# Patient Record
Sex: MALE | Race: BLACK OR AFRICAN AMERICAN | NOT HISPANIC OR LATINO | URBAN - METROPOLITAN AREA
[De-identification: names, ages, dates, MRNs, and addresses within clinical notes are randomized per-mention and may not be internally consistent; named-entity substitution may affect disease eponyms.]

---

## 2018-03-11 ENCOUNTER — EMERGENCY (EMERGENCY)
Facility: HOSPITAL | Age: 48
LOS: 1 days | Discharge: HOME | End: 2018-03-11
Admitting: EMERGENCY MEDICINE
Payer: MEDICAID

## 2018-03-11 VITALS
OXYGEN SATURATION: 98 % | HEART RATE: 96 BPM | DIASTOLIC BLOOD PRESSURE: 88 MMHG | RESPIRATION RATE: 16 BRPM | TEMPERATURE: 99 F | SYSTOLIC BLOOD PRESSURE: 125 MMHG

## 2018-03-11 DIAGNOSIS — M79.604 PAIN IN RIGHT LEG: ICD-10-CM

## 2018-03-11 DIAGNOSIS — F17.200 NICOTINE DEPENDENCE, UNSPECIFIED, UNCOMPLICATED: ICD-10-CM

## 2018-03-11 PROCEDURE — 73590 X-RAY EXAM OF LOWER LEG: CPT | Mod: 26,RT

## 2018-03-11 PROCEDURE — 99283 EMERGENCY DEPT VISIT LOW MDM: CPT | Mod: 25

## 2018-03-11 RX ORDER — IBUPROFEN 200 MG
600 TABLET ORAL ONCE
Qty: 0 | Refills: 0 | Status: COMPLETED | OUTPATIENT
Start: 2018-03-11 | End: 2018-03-11

## 2018-03-12 DIAGNOSIS — Z96.7 PRESENCE OF OTHER BONE AND TENDON IMPLANTS: Chronic | ICD-10-CM

## 2018-03-12 PROCEDURE — 73590 X-RAY EXAM OF LOWER LEG: CPT | Mod: 26,RT

## 2018-03-12 RX ORDER — BACITRACIN ZINC 500 UNIT/G
1 OINTMENT IN PACKET (EA) TOPICAL ONCE
Qty: 0 | Refills: 0 | Status: COMPLETED | OUTPATIENT
Start: 2018-03-12 | End: 2018-03-12

## 2018-03-12 RX ADMIN — Medication 600 MILLIGRAM(S): at 00:54

## 2018-03-12 RX ADMIN — Medication 1 APPLICATION(S): at 00:53

## 2018-03-12 NOTE — ED PROVIDER NOTE - DIAGNOSTIC INTERPRETATION
Xray (wet reads) interpreted by LYNN MARQUIS   Xray tib/fib - hardwares intact, no soft tissue swelling. no acute fx or dislocation, joint space intact, no effusion noted

## 2018-03-12 NOTE — ED PROVIDER NOTE - MEDICAL DECISION MAKING DETAILS
pt w chronic leg pain x 3 months, NV intact, hardwares in place on xray, no s/s of infection on exam, ambulatory without gait disturbance, given dose of motrin, d/c home to f/u pain management and ortho, pt verbalized understanding
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
## 2018-03-12 NOTE — ED PROVIDER NOTE - PHYSICAL EXAMINATION
Gen - WDWN, +AOB, no acute distress  Skin - warm, dry, intact, xerosis to the abdominal pain with excoriation, no erythema, edema, d/c, vesicles or acute rash   HEENT - AT/NC, PERRL, mild conjunctival injection, o/p clear, uvula midline, airway patent  CV - S1S2, R/R/R  Resp - respiration non-labored, CTAB, symmetric bs b/l, no r/r/w  GI - NABS, soft, ND, NT, no rebound or guarding, no CVAT b/l  MS - w/w/p, RLE with no focal erythema, edema, tenderness, fluctuance, warmth or crepitus, NV intact, FROM, ambulatory, no protusion of instruments noted   Neuro - Alert and awake, slightly slurred speech, ambulatory steady gait, no focal deficits Gen - WDWN, +AOB, no acute distress  Skin - warm, dry, intact, xerosis to the abdominal pain with excoriation, no erythema, edema, d/c, vesicles or acute rash   HEENT - AT/NC, PERRL, mild conjunctival injection, o/p clear, uvula midline, airway patent  CV - S1S2, R/R/R  Resp - respiration non-labored, CTAB, symmetric bs b/l, no r/r/w  GI - NABS, soft, ND, NT, no rebound or guarding, no CVAT b/l  MS - w/w/p, RLE with no focal erythema, edema, tenderness, fluctuance, warmth or crepitus, NV intact, FROM, ambulatory, no protrusion of instruments noted, palpable symmetric distal pulses, compartment soft  Neuro - Alert and awake, slightly slurred speech, ambulatory steady gait, no focal deficits

## 2018-09-30 ENCOUNTER — EMERGENCY (EMERGENCY)
Facility: HOSPITAL | Age: 48
LOS: 1 days | Discharge: HOME | End: 2018-09-30
Attending: EMERGENCY MEDICINE | Admitting: EMERGENCY MEDICINE

## 2018-09-30 VITALS
HEART RATE: 99 BPM | RESPIRATION RATE: 20 BRPM | SYSTOLIC BLOOD PRESSURE: 101 MMHG | DIASTOLIC BLOOD PRESSURE: 69 MMHG | OXYGEN SATURATION: 97 % | TEMPERATURE: 98 F

## 2018-09-30 DIAGNOSIS — K08.89 OTHER SPECIFIED DISORDERS OF TEETH AND SUPPORTING STRUCTURES: ICD-10-CM

## 2018-09-30 DIAGNOSIS — Z96.7 PRESENCE OF OTHER BONE AND TENDON IMPLANTS: Chronic | ICD-10-CM

## 2018-09-30 NOTE — ED ADULT TRIAGE NOTE - AS TEMP SITE
Problem: Patient Care Overview (Adult)  Goal: Plan of Care Review  Outcome: Ongoing (interventions implemented as appropriate)    08/09/17 1248   Coping/Psychosocial Response Interventions   Plan Of Care Reviewed With patient   Patient Care Overview   Progress no change       Goal: Adult Individualization and Mutuality  Outcome: Ongoing (interventions implemented as appropriate)    08/09/17 1248   Individualization   Patient Specific Preferences PT GOES BY [facility]       Goal: Discharge Needs Assessment  Outcome: Ongoing (interventions implemented as appropriate)    Problem: Perioperative Period (Adult)  Goal: Signs and Symptoms of Listed Potential Problems Will be Absent or Manageable (Perioperative Period)  Outcome: Ongoing (interventions implemented as appropriate)    08/09/17 1248   Perioperative Period   Problems Assessed (Perioperative Period) pain;infection   Problems Present (Perioperative Period) none           
Problem: Patient Care Overview (Adult)  Goal: Plan of Care Review  Outcome: Outcome(s) achieved Date Met:  08/09/17 08/09/17 1724   Coping/Psychosocial Response Interventions   Plan Of Care Reviewed With patient;spouse   Patient Care Overview   Progress improving   Outcome Evaluation   Outcome Summary/Follow up Plan READY FOR DISCHARGE       Goal: Adult Individualization and Mutuality  Outcome: Outcome(s) achieved Date Met:  08/09/17  Goal: Discharge Needs Assessment  Outcome: Outcome(s) achieved Date Met:  08/09/17    Problem: Perioperative Period (Adult)  Goal: Signs and Symptoms of Listed Potential Problems Will be Absent or Manageable (Perioperative Period)  Outcome: Outcome(s) achieved Date Met:  08/09/17 08/09/17 1724   Perioperative Period   Problems Assessed (Perioperative Period) pain;hemorrhage;hypoxia/hypoxemia;perioperative injury;infection;physiologic stress response;situational response   Problems Present (Perioperative Period) none           
oral

## 2018-10-01 PROBLEM — S82.90XA UNSPECIFIED FRACTURE OF UNSPECIFIED LOWER LEG, INITIAL ENCOUNTER FOR CLOSED FRACTURE: Chronic | Status: ACTIVE | Noted: 2018-03-12
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
## 2018-10-01 PROBLEM — M75.00 ADHESIVE CAPSULITIS OF UNSPECIFIED SHOULDER: Chronic | Status: ACTIVE | Noted: 2018-03-12

## 2019-07-20 ENCOUNTER — EMERGENCY (EMERGENCY)
Facility: HOSPITAL | Age: 49
LOS: 1 days | Discharge: HOME | End: 2019-07-20
Admitting: EMERGENCY MEDICINE
Payer: MEDICAID

## 2019-07-20 VITALS
DIASTOLIC BLOOD PRESSURE: 73 MMHG | TEMPERATURE: 99 F | SYSTOLIC BLOOD PRESSURE: 103 MMHG | HEART RATE: 98 BPM | WEIGHT: 149.91 LBS | RESPIRATION RATE: 18 BRPM | OXYGEN SATURATION: 95 %

## 2019-07-20 DIAGNOSIS — Z96.7 PRESENCE OF OTHER BONE AND TENDON IMPLANTS: Chronic | ICD-10-CM

## 2019-07-20 PROCEDURE — 99283 EMERGENCY DEPT VISIT LOW MDM: CPT

## 2019-07-20 NOTE — ED ADULT NURSE NOTE - NSIMPLEMENTINTERV_GEN_ALL_ED
Implemented All Universal Safety Interventions:  [place] to call system. Call bell, personal items and telephone within reach. Instruct patient to call for assistance. Room bathroom lighting operational. Non-slip footwear when patient is off stretcher. Physically safe environment: no spills, clutter or unnecessary equipment. Stretcher in lowest position, wheels locked, appropriate side rails in place.

## 2019-07-20 NOTE — ED PROVIDER NOTE - PROGRESS NOTE DETAILS
patient now awake, alert, coherent, a&ox4, clear speech, tolerating PO, steady gait, denies SI/HI, will give detox lists. patient asking to leave

## 2019-07-20 NOTE — ED ADULT TRIAGE NOTE - CHIEF COMPLAINT QUOTE
here for ams was picked up [address]- admits to drinking alcohol and taking 180mg of methadone - pt is awake and alert in triage- presents with no obvious injury or trauma- follows simple commands
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
## 2019-07-24 DIAGNOSIS — R41.82 ALTERED MENTAL STATUS, UNSPECIFIED: ICD-10-CM

## 2019-07-24 DIAGNOSIS — F10.10 ALCOHOL ABUSE, UNCOMPLICATED: ICD-10-CM

## 2020-01-09 ENCOUNTER — EMERGENCY (EMERGENCY)
Facility: HOSPITAL | Age: 50
LOS: 1 days | Discharge: HOME | End: 2020-01-09
Attending: EMERGENCY MEDICINE | Admitting: EMERGENCY MEDICINE
Payer: MEDICAID

## 2020-01-09 VITALS
DIASTOLIC BLOOD PRESSURE: 91 MMHG | SYSTOLIC BLOOD PRESSURE: 134 MMHG | RESPIRATION RATE: 20 BRPM | HEART RATE: 80 BPM | OXYGEN SATURATION: 97 % | HEIGHT: 73 IN | TEMPERATURE: 98 F | WEIGHT: 177.91 LBS

## 2020-01-09 DIAGNOSIS — Z96.7 PRESENCE OF OTHER BONE AND TENDON IMPLANTS: Chronic | ICD-10-CM

## 2020-01-09 PROCEDURE — 99283 EMERGENCY DEPT VISIT LOW MDM: CPT

## 2020-01-09 RX ORDER — TETANUS TOXOID, REDUCED DIPHTHERIA TOXOID AND ACELLULAR PERTUSSIS VACCINE, ADSORBED 5; 2.5; 8; 8; 2.5 [IU]/.5ML; [IU]/.5ML; UG/.5ML; UG/.5ML; UG/.5ML
0.5 SUSPENSION INTRAMUSCULAR ONCE
Refills: 0 | Status: COMPLETED | OUTPATIENT
Start: 2020-01-09 | End: 2020-01-09

## 2020-01-09 RX ORDER — IBUPROFEN 200 MG
1 TABLET ORAL
Qty: 30 | Refills: 0
Start: 2020-01-09

## 2020-01-09 RX ORDER — ACETAMINOPHEN 500 MG
2 TABLET ORAL
Qty: 60 | Refills: 0
Start: 2020-01-09

## 2020-01-09 RX ADMIN — TETANUS TOXOID, REDUCED DIPHTHERIA TOXOID AND ACELLULAR PERTUSSIS VACCINE, ADSORBED 0.5 MILLILITER(S): 5; 2.5; 8; 8; 2.5 SUSPENSION INTRAMUSCULAR at 16:46
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
## 2020-01-09 NOTE — ED PROVIDER NOTE - NSFOLLOWUPINSTRUCTIONS_ED_ALL_ED_FT
You were given a tetanus and pertussis (Whooping cough) combined booster. This is good for 10 years.    Please apply bacitracin to the nasal wound 2-3 times per day until it heals.    Return to the ER for signs of infection.

## 2020-01-09 NOTE — ED PROVIDER NOTE - SKIN, MLM
small superficial abrasion to left side of nose with small amount of localized swelling, no bony tenderness

## 2020-01-09 NOTE — ED ADULT NURSE NOTE - NSIMPLEMENTINTERV_GEN_ALL_ED
Implemented All Universal Safety Interventions:  [place] to call system. Call bell, personal items and telephone within reach. Instruct patient to call for assistance. Room bathroom lighting operational. Non-slip footwear when patient is off stretcher. Physically safe environment: no spills, clutter or unnecessary equipment. Stretcher in lowest position, wheels locked, appropriate side rails in place.

## 2020-01-09 NOTE — ED PROVIDER NOTE - PATIENT PORTAL LINK FT
You can access the FollowMyHealth Patient Portal offered by [facility] by registering at the following website: [email or web address]. By joining [employer]’s FollowMyHealth portal, you will also be able to view your health information using other applications (apps) compatible with our system.

## 2020-01-09 NOTE — ED ADULT TRIAGE NOTE - CHIEF COMPLAINT QUOTE
Patient states that his dog accidentally bit him in the face, small abrasion noted to his nasal bridge; patient states his dog should be up to date on vaccines since it was his daughter's dog; patient unsure of tetanus status; no redness, swelling at site

## 2020-01-09 NOTE — ED PROVIDER NOTE - CLINICAL SUMMARY MEDICAL DECISION MAKING FREE TEXT BOX
50 yo M presents with healing abrasion to nose s/p dog bite 2 days ago.  abrasion to left nose with localized swelling, no streaking, pt afebrile. Pt instructed to apply bacitracin to wound, wound care instructions provided. Will update Tetanus.
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
## 2020-01-13 DIAGNOSIS — Z23 ENCOUNTER FOR IMMUNIZATION: ICD-10-CM
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
## 2020-01-13 DIAGNOSIS — S00.31XA ABRASION OF NOSE, INITIAL ENCOUNTER: ICD-10-CM
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
## 2020-01-13 DIAGNOSIS — S01.21XA LACERATION WITHOUT FOREIGN BODY OF NOSE, INITIAL ENCOUNTER: ICD-10-CM

## 2020-01-13 DIAGNOSIS — Z79.1 LONG TERM (CURRENT) USE OF NON-STEROIDAL ANTI-INFLAMMATORIES (NSAID): ICD-10-CM

## 2021-05-15 ENCOUNTER — EMERGENCY (EMERGENCY)
Facility: HOSPITAL | Age: 51
LOS: 1 days | Discharge: HOME | End: 2021-05-15
Attending: EMERGENCY MEDICINE | Admitting: EMERGENCY MEDICINE
Payer: MEDICAID

## 2021-05-15 VITALS
WEIGHT: 190.04 LBS | DIASTOLIC BLOOD PRESSURE: 66 MMHG | HEIGHT: 73 IN | OXYGEN SATURATION: 97 % | HEART RATE: 94 BPM | RESPIRATION RATE: 16 BRPM | SYSTOLIC BLOOD PRESSURE: 120 MMHG | TEMPERATURE: 98 F

## 2021-05-15 DIAGNOSIS — Z79.1 LONG TERM (CURRENT) USE OF NON-STEROIDAL ANTI-INFLAMMATORIES (NSAID): ICD-10-CM

## 2021-05-15 DIAGNOSIS — S60.512A ABRASION OF LEFT HAND, INITIAL ENCOUNTER: ICD-10-CM

## 2021-05-15 DIAGNOSIS — Z96.7 PRESENCE OF OTHER BONE AND TENDON IMPLANTS: Chronic | ICD-10-CM
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
## 2021-05-15 DIAGNOSIS — J45.909 UNSPECIFIED ASTHMA, UNCOMPLICATED: ICD-10-CM
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
## 2021-05-15 PROBLEM — M87.00 IDIOPATHIC ASEPTIC NECROSIS OF UNSPECIFIED BONE: Chronic | Status: ACTIVE | Noted: 2020-01-09

## 2021-05-15 PROCEDURE — 99283 EMERGENCY DEPT VISIT LOW MDM: CPT

## 2021-05-15 RX ORDER — BACITRACIN ZINC 500 UNIT/G
1 OINTMENT IN PACKET (EA) TOPICAL ONCE
Refills: 0 | Status: COMPLETED | OUTPATIENT
Start: 2021-05-15 | End: 2021-05-15

## 2021-05-15 RX ORDER — CEPHALEXIN 500 MG
500 CAPSULE ORAL ONCE
Refills: 0 | Status: COMPLETED | OUTPATIENT
Start: 2021-05-15 | End: 2021-05-15
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
## 2021-05-15 NOTE — ED ADULT NURSE REASSESSMENT NOTE - NS ED NURSE REASSESS COMMENT FT1
checked pt chair area 10miins apart pt not there ed state pt said to her he was leaving, pt did not get po ab therefore med placed back in [facility]

## 2021-05-15 NOTE — ED PROVIDER NOTE - MUSCULOSKELETAL MINIMAL EXAM
+ sts left hand dorsum with overlying superficial abrasions/lacerations with pink granulation tissue, no erythema, no warmth, no lymphangitis, FROM of left hand fingers. + chronic deformity of left 4th DIP from previous fracture 11 years ago ( per patient)
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
## 2021-08-30 ENCOUNTER — EMERGENCY (EMERGENCY)
Facility: HOSPITAL | Age: 51
LOS: 1 days | Discharge: LEFT BEFORE DISCHARGE | End: 2021-08-30
Attending: EMERGENCY MEDICINE | Admitting: EMERGENCY MEDICINE
Payer: MEDICAID

## 2021-08-30 VITALS
TEMPERATURE: 98 F | SYSTOLIC BLOOD PRESSURE: 131 MMHG | HEART RATE: 83 BPM | DIASTOLIC BLOOD PRESSURE: 84 MMHG | RESPIRATION RATE: 17 BRPM | OXYGEN SATURATION: 100 %

## 2021-08-30 VITALS
SYSTOLIC BLOOD PRESSURE: 153 MMHG | DIASTOLIC BLOOD PRESSURE: 95 MMHG | OXYGEN SATURATION: 100 % | WEIGHT: 160.06 LBS | RESPIRATION RATE: 16 BRPM | HEIGHT: 73 IN | TEMPERATURE: 98 F | HEART RATE: 120 BPM

## 2021-08-30 DIAGNOSIS — R11.2 NAUSEA WITH VOMITING, UNSPECIFIED: ICD-10-CM

## 2021-08-30 DIAGNOSIS — R10.84 GENERALIZED ABDOMINAL PAIN: ICD-10-CM

## 2021-08-30 DIAGNOSIS — R00.0 TACHYCARDIA, UNSPECIFIED: ICD-10-CM

## 2021-08-30 DIAGNOSIS — Z87.898 PERSONAL HISTORY OF OTHER SPECIFIED CONDITIONS: ICD-10-CM

## 2021-08-30 DIAGNOSIS — R25.1 TREMOR, UNSPECIFIED: ICD-10-CM

## 2021-08-30 DIAGNOSIS — R19.7 DIARRHEA, UNSPECIFIED: ICD-10-CM

## 2021-08-30 DIAGNOSIS — Z96.7 PRESENCE OF OTHER BONE AND TENDON IMPLANTS: Chronic | ICD-10-CM

## 2021-08-30 DIAGNOSIS — J45.909 UNSPECIFIED ASTHMA, UNCOMPLICATED: ICD-10-CM

## 2021-08-30 DIAGNOSIS — Z79.891 LONG TERM (CURRENT) USE OF OPIATE ANALGESIC: ICD-10-CM
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
## 2021-08-30 DIAGNOSIS — F10.239 ALCOHOL DEPENDENCE WITH WITHDRAWAL, UNSPECIFIED: ICD-10-CM

## 2021-08-30 DIAGNOSIS — G43.909 MIGRAINE, UNSPECIFIED, NOT INTRACTABLE, WITHOUT STATUS MIGRAINOSUS: ICD-10-CM

## 2021-08-30 DIAGNOSIS — Z20.822 CONTACT WITH AND (SUSPECTED) EXPOSURE TO COVID-19: ICD-10-CM

## 2021-08-30 LAB
ALBUMIN SERPL ELPH-MCNC: 3.2 G/DL — LOW (ref 3.4–5)
ALP SERPL-CCNC: 532 U/L — HIGH (ref 40–120)
ALT FLD-CCNC: 177 U/L — HIGH (ref 12–42)
ANION GAP SERPL CALC-SCNC: 14 MMOL/L — SIGNIFICANT CHANGE UP (ref 9–16)
APPEARANCE UR: ABNORMAL
AST SERPL-CCNC: 434 U/L — HIGH (ref 15–37)
BACTERIA # UR AUTO: ABNORMAL /HPF
BASOPHILS # BLD AUTO: 0.01 K/UL — SIGNIFICANT CHANGE UP (ref 0–0.2)
BASOPHILS NFR BLD AUTO: 0.2 % — SIGNIFICANT CHANGE UP (ref 0–2)
BILIRUB SERPL-MCNC: 2.4 MG/DL — HIGH (ref 0.2–1.2)
BILIRUB UR-MCNC: ABNORMAL
BUN SERPL-MCNC: 15 MG/DL — SIGNIFICANT CHANGE UP (ref 7–23)
CALCIUM SERPL-MCNC: 9.4 MG/DL — SIGNIFICANT CHANGE UP (ref 8.5–10.5)
CHLORIDE SERPL-SCNC: 95 MMOL/L — LOW (ref 96–108)
CO2 SERPL-SCNC: 26 MMOL/L — SIGNIFICANT CHANGE UP (ref 22–31)
COLOR SPEC: ABNORMAL
CREAT SERPL-MCNC: 1.17 MG/DL — SIGNIFICANT CHANGE UP (ref 0.5–1.3)
DIFF PNL FLD: ABNORMAL
EOSINOPHIL # BLD AUTO: 0 K/UL — SIGNIFICANT CHANGE UP (ref 0–0.5)
EOSINOPHIL NFR BLD AUTO: 0 % — SIGNIFICANT CHANGE UP (ref 0–6)
EPI CELLS # UR: ABNORMAL /HPF (ref 0–5)
ETHANOL SERPL-MCNC: <3 MG/DL — SIGNIFICANT CHANGE UP
GLUCOSE SERPL-MCNC: 96 MG/DL — SIGNIFICANT CHANGE UP (ref 70–99)
GLUCOSE UR QL: 100
HCT VFR BLD CALC: 34.4 % — LOW (ref 39–50)
HGB BLD-MCNC: 12 G/DL — LOW (ref 13–17)
IMM GRANULOCYTES NFR BLD AUTO: 0.4 % — SIGNIFICANT CHANGE UP (ref 0–1.5)
KETONES UR-MCNC: NEGATIVE — SIGNIFICANT CHANGE UP
LEUKOCYTE ESTERASE UR-ACNC: NEGATIVE — SIGNIFICANT CHANGE UP
LIDOCAIN IGE QN: 70 U/L — LOW (ref 73–393)
LYMPHOCYTES # BLD AUTO: 1.3 K/UL — SIGNIFICANT CHANGE UP (ref 1–3.3)
LYMPHOCYTES # BLD AUTO: 25.7 % — SIGNIFICANT CHANGE UP (ref 13–44)
MANUAL SMEAR VERIFICATION: SIGNIFICANT CHANGE UP
MCHC RBC-ENTMCNC: 33 PG — SIGNIFICANT CHANGE UP (ref 27–34)
MCHC RBC-ENTMCNC: 34.9 GM/DL — SIGNIFICANT CHANGE UP (ref 32–36)
MCV RBC AUTO: 94.5 FL — SIGNIFICANT CHANGE UP (ref 80–100)
MONOCYTES # BLD AUTO: 0.64 K/UL — SIGNIFICANT CHANGE UP (ref 0–0.9)
MONOCYTES NFR BLD AUTO: 12.7 % — SIGNIFICANT CHANGE UP (ref 2–14)
NEUTROPHILS # BLD AUTO: 3.08 K/UL — SIGNIFICANT CHANGE UP (ref 1.8–7.4)
NEUTROPHILS NFR BLD AUTO: 61 % — SIGNIFICANT CHANGE UP (ref 43–77)
NITRITE UR-MCNC: POSITIVE
NRBC # BLD: 0 /100 WBCS — SIGNIFICANT CHANGE UP (ref 0–0)
PH UR: 6 — SIGNIFICANT CHANGE UP (ref 5–8)
PLAT MORPH BLD: SIGNIFICANT CHANGE UP
PLATELET # BLD AUTO: 142 K/UL — LOW (ref 150–400)
POTASSIUM SERPL-MCNC: 4.6 MMOL/L — SIGNIFICANT CHANGE UP (ref 3.5–5.3)
POTASSIUM SERPL-SCNC: 4.6 MMOL/L — SIGNIFICANT CHANGE UP (ref 3.5–5.3)
PROT SERPL-MCNC: 9 G/DL — HIGH (ref 6.4–8.2)
PROT UR-MCNC: 100 MG/DL
RBC # BLD: 3.64 M/UL — LOW (ref 4.2–5.8)
RBC # FLD: 16.4 % — HIGH (ref 10.3–14.5)
RBC BLD AUTO: SIGNIFICANT CHANGE UP
RBC CASTS # UR COMP ASSIST: < 5 /HPF — SIGNIFICANT CHANGE UP
SARS-COV-2 RNA SPEC QL NAA+PROBE: SIGNIFICANT CHANGE UP
SODIUM SERPL-SCNC: 135 MMOL/L — SIGNIFICANT CHANGE UP (ref 132–145)
SP GR SPEC: >=1.03 — SIGNIFICANT CHANGE UP (ref 1–1.03)
UROBILINOGEN FLD QL: 4 E.U./DL
WBC # BLD: 5.05 K/UL — SIGNIFICANT CHANGE UP (ref 3.8–10.5)
WBC # FLD AUTO: 5.05 K/UL — SIGNIFICANT CHANGE UP (ref 3.8–10.5)
WBC UR QL: < 5 /HPF — SIGNIFICANT CHANGE UP

## 2021-08-30 PROCEDURE — 76705 ECHO EXAM OF ABDOMEN: CPT | Mod: 26

## 2021-08-30 PROCEDURE — 93010 ELECTROCARDIOGRAM REPORT: CPT

## 2021-08-30 PROCEDURE — 99284 EMERGENCY DEPT VISIT MOD MDM: CPT | Mod: 25

## 2021-08-30 PROCEDURE — 74177 CT ABD & PELVIS W/CONTRAST: CPT | Mod: 26

## 2021-08-30 RX ORDER — ONDANSETRON 8 MG/1
4 TABLET, FILM COATED ORAL ONCE
Refills: 0 | Status: COMPLETED | OUTPATIENT
Start: 2021-08-30 | End: 2021-08-30

## 2021-08-30 RX ORDER — METOCLOPRAMIDE HCL 10 MG
10 TABLET ORAL ONCE
Refills: 0 | Status: COMPLETED | OUTPATIENT
Start: 2021-08-30 | End: 2021-08-30

## 2021-08-30 RX ORDER — MORPHINE SULFATE 50 MG/1
4 CAPSULE, EXTENDED RELEASE ORAL ONCE
Refills: 0 | Status: DISCONTINUED | OUTPATIENT
Start: 2021-08-30 | End: 2021-08-30

## 2021-08-30 RX ORDER — PANTOPRAZOLE SODIUM 20 MG/1
80 TABLET, DELAYED RELEASE ORAL ONCE
Refills: 0 | Status: COMPLETED | OUTPATIENT
Start: 2021-08-30 | End: 2021-08-30

## 2021-08-30 RX ORDER — SODIUM CHLORIDE 9 MG/ML
1000 INJECTION INTRAMUSCULAR; INTRAVENOUS; SUBCUTANEOUS ONCE
Refills: 0 | Status: COMPLETED | OUTPATIENT
Start: 2021-08-30 | End: 2021-08-30

## 2021-08-30 RX ORDER — IOHEXOL 300 MG/ML
30 INJECTION, SOLUTION INTRAVENOUS ONCE
Refills: 0 | Status: COMPLETED | OUTPATIENT
Start: 2021-08-30 | End: 2021-08-30

## 2021-08-30 RX ADMIN — Medication 10 MILLIGRAM(S): at 21:39

## 2021-08-30 RX ADMIN — Medication 2 MILLIGRAM(S): at 21:10

## 2021-08-30 RX ADMIN — MORPHINE SULFATE 4 MILLIGRAM(S): 50 CAPSULE, EXTENDED RELEASE ORAL at 18:47

## 2021-08-30 RX ADMIN — ONDANSETRON 4 MILLIGRAM(S): 8 TABLET, FILM COATED ORAL at 18:36

## 2021-08-30 RX ADMIN — Medication 104 MILLIGRAM(S): at 21:09

## 2021-08-30 RX ADMIN — SODIUM CHLORIDE 1000 MILLILITER(S): 9 INJECTION INTRAMUSCULAR; INTRAVENOUS; SUBCUTANEOUS at 18:36

## 2021-08-30 RX ADMIN — PANTOPRAZOLE SODIUM 80 MILLIGRAM(S): 20 TABLET, DELAYED RELEASE ORAL at 18:36

## 2021-08-30 RX ADMIN — IOHEXOL 30 MILLILITER(S): 300 INJECTION, SOLUTION INTRAVENOUS at 18:37

## 2021-08-30 RX ADMIN — Medication 75 MILLIGRAM(S): at 21:10

## 2021-08-30 NOTE — ED ADULT NURSE REASSESSMENT NOTE - NS ED NURSE REASSESS COMMENT FT1
Received pt from pervious RN. Pt aox4, speech clear and coherent. Pt c/o generalized abdominal pain. Denies acute nausea, not seen vomiting. Pt endorses drinking daily, denies drug use. States last drink was this morning.

## 2021-08-30 NOTE — ED PROVIDER NOTE - OBJECTIVE STATEMENT
51 yo M w/ PMHx of asthma and seizure disorder, on methadone for prior heroin use (snorting) presents to the ED c/o generalized abdominal pain with N/V x 2 days. Pt states pain is localized over umbilical region, no radiating. Pt reports sharp, cramping pain, severe in nature, with associated multiple episodes of N/V (at least 10 episodes daily). Pt notes emesis was initially NBNB, but notes red specks of blood in late episodes of vomiting today. Pt also notes mild diarrhea with 2-3 episodes loose brown stool yesterday and today. No fever, chills, chest pain, SOB, cough, dysuria or hematuria, back pain or changes in diet or new foods, no recent travel. Pt notes he took his methadone dose this morning without issue.

## 2021-08-30 NOTE — ED PROVIDER NOTE - CLINICAL SUMMARY MEDICAL DECISION MAKING FREE TEXT BOX
Pt presents to the ED w/ diffuse generalized abdominal pain with multiple episodes of N/V/D. Pt with diffuse abdominal tenderness throughout, uncomfortable appearing and regular tachycardia on exam. DDx: intraabdominal infection, pancreatitis, gastroenteritis, appendicitis; Given blood specked emesis earlier today, suspect possible Berenice-Bennett tear - will give Protonix. Will order CT abd/pelvis, EKG, labs including lipase, COVID swab, UA; IV fluids, morphine and zofran given for symptom relief.

## 2021-08-30 NOTE — ED PROVIDER NOTE - PROGRESS NOTE DETAILS
Throughout stay, pt noted to having worsening tremors.  Pt initially denies drinking but now admits to daily drinking.  Given sxs he was unable to drink since yesterday.  Denies h/o etoh w/d.  BYRON is 12.  Wrote for ativan and librium.  Tolerating PO at this time. Pt is adamant that he cannot stay in the ED any longer.  He reports he feels better and needs to get back to his program.  CIWA has improved to ~8.  I discussed risks of leaving prior to CT results and pt accepts these risks.  He is of clear mind and of clear decision making capacity.  I requested multiple times that he stays but he would like to accept risks and sign out against medical advice.  He understands he can return at any time for re-evaluation/further care.

## 2021-08-30 NOTE — ED ADULT NURSE NOTE - OBJECTIVE STATEMENT
Patient presented to the ED with cc of abd pain N/V/D x 2 days. Patient states he can not keep anything down. Patient actively vomiting in the ED. Patient reports being able to take daily methadone dose (190mg).
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
## 2021-08-30 NOTE — ED PROVIDER NOTE - NSFOLLOWUPINSTRUCTIONS_ED_ALL_ED_FT
-PLEASE RETURN HERE OR SEE YOUR PRIMARY CARE DOCTOR AS SOON AS POSSIBLE FOR RE-EVALUATION.        -PLEASE RETURN TO THE ER IMMEDIATELY OR CALL 911 FOR ANY HIGH FEVER, TROUBLE BREATHING, VOMITING, SEVERE PAIN, OR ANY OTHER CONCERNS.

## 2021-08-30 NOTE — ED ADULT TRIAGE NOTE - MEANS OF ARRIVAL
IV removed, VS obtained. Patient received and reviewed discharge instructions and follow up plan. Nad at discharge by ambulation with family at side. Patient ID band not removed prior to discharge. ambulatory

## 2021-08-30 NOTE — ED PROVIDER NOTE - PHYSICAL EXAMINATION
CONSTITUTIONAL: Uncomfortable appearing, Well-developed; well-nourished; in no acute distress.  	SKIN: Skin is warm and dry, no acute rash.  	HEAD: Normocephalic; atraumatic.  	EYES: PERRL, EOM intact; conjunctiva and sclera clear.  	ENT: No nasal discharge; airway clear.  no tonsillar swelling or exudates, uvula midline, airway patent  	NECK: Supple; non tender.  	CARD: S1, S2 normal; no murmurs, gallops, or rubs. Regular tachycardia and rhythm.  	RESP: No wheezes, rales or rhonchi.  	ABD: hyperactive bowel sounds, abdomen soft, nondistended, +tender throughout entire abdomen, no rebound or guarding, no CVA tenderness   	EXT: Normal ROM. No clubbing, cyanosis or edema.  	NEURO: Alert, oriented. Grossly unremarkable.  PSYCH: Cooperative, appropriate.

## 2021-08-30 NOTE — ED ADULT TRIAGE NOTE - WEIGHT IN KG
----- Message from Inna Harvey sent at 11/27/2017  1:35 PM CST -----  Contact: ceci carson  Would like to consult with nurse regarding resending pre op clearance to [phone]. Please call back if needed at [phone]        Thanks,  Inna Harvey     72.6
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
## 2021-08-30 NOTE — ED PROVIDER NOTE - PATIENT PORTAL LINK FT
You can access the FollowMyHealth Patient Portal offered by [facility] by registering at the following website: [email or web address]. By joining [employer]’s FollowMyHealth portal, you will also be able to view your health information using other applications (apps) compatible with our system.

## 2021-12-16 ENCOUNTER — EMERGENCY (EMERGENCY)
Facility: HOSPITAL | Age: 51
LOS: 1 days | Discharge: LEFT BEFORE DISCHARGE | End: 2021-12-16
Attending: EMERGENCY MEDICINE | Admitting: EMERGENCY MEDICINE
Payer: MEDICAID

## 2021-12-16 VITALS
RESPIRATION RATE: 20 BRPM | OXYGEN SATURATION: 96 % | WEIGHT: 175.05 LBS | HEIGHT: 73 IN | DIASTOLIC BLOOD PRESSURE: 72 MMHG | HEART RATE: 80 BPM | SYSTOLIC BLOOD PRESSURE: 98 MMHG | TEMPERATURE: 98 F

## 2021-12-16 DIAGNOSIS — R41.82 ALTERED MENTAL STATUS, UNSPECIFIED: ICD-10-CM

## 2021-12-16 DIAGNOSIS — E16.2 HYPOGLYCEMIA, UNSPECIFIED: ICD-10-CM

## 2021-12-16 DIAGNOSIS — F10.10 ALCOHOL ABUSE, UNCOMPLICATED: ICD-10-CM

## 2021-12-16 DIAGNOSIS — F17.200 NICOTINE DEPENDENCE, UNSPECIFIED, UNCOMPLICATED: ICD-10-CM

## 2021-12-16 DIAGNOSIS — Z20.822 CONTACT WITH AND (SUSPECTED) EXPOSURE TO COVID-19: ICD-10-CM

## 2021-12-16 DIAGNOSIS — Z96.7 PRESENCE OF OTHER BONE AND TENDON IMPLANTS: Chronic | ICD-10-CM

## 2021-12-16 DIAGNOSIS — J45.909 UNSPECIFIED ASTHMA, UNCOMPLICATED: ICD-10-CM

## 2021-12-16 DIAGNOSIS — F11.20 OPIOID DEPENDENCE, UNCOMPLICATED: ICD-10-CM

## 2021-12-16 DIAGNOSIS — Z86.59 PERSONAL HISTORY OF OTHER MENTAL AND BEHAVIORAL DISORDERS: ICD-10-CM

## 2021-12-16 DIAGNOSIS — F13.10 SEDATIVE, HYPNOTIC OR ANXIOLYTIC ABUSE, UNCOMPLICATED: ICD-10-CM

## 2021-12-16 LAB
GLUCOSE BLDC GLUCOMTR-MCNC: 37 MG/DL — CRITICAL LOW (ref 70–99)
GLUCOSE BLDC GLUCOMTR-MCNC: 54 MG/DL — CRITICAL LOW (ref 70–99)
GLUCOSE BLDC GLUCOMTR-MCNC: 56 MG/DL — LOW (ref 70–99)
GLUCOSE BLDC GLUCOMTR-MCNC: 78 MG/DL — SIGNIFICANT CHANGE UP (ref 70–99)
HCT VFR BLD CALC: 33.6 % — LOW (ref 39–50)
HGB BLD-MCNC: 11.7 G/DL — LOW (ref 13–17)
MCHC RBC-ENTMCNC: 33.7 PG — SIGNIFICANT CHANGE UP (ref 27–34)
MCHC RBC-ENTMCNC: 34.8 GM/DL — SIGNIFICANT CHANGE UP (ref 32–36)
MCV RBC AUTO: 96.8 FL — SIGNIFICANT CHANGE UP (ref 80–100)
PLATELET # BLD AUTO: 189 K/UL — SIGNIFICANT CHANGE UP (ref 150–400)
RBC # BLD: 3.47 M/UL — LOW (ref 4.2–5.8)
RBC # FLD: 12.3 % — SIGNIFICANT CHANGE UP (ref 10.3–14.5)
WBC # BLD: 2.98 K/UL — LOW (ref 3.8–10.5)
WBC # FLD AUTO: 2.98 K/UL — LOW (ref 3.8–10.5)

## 2021-12-16 PROCEDURE — 99284 EMERGENCY DEPT VISIT MOD MDM: CPT

## 2021-12-16 RX ORDER — DEXTROSE 10 % IN WATER 10 %
1000 INTRAVENOUS SOLUTION INTRAVENOUS
Refills: 0 | Status: DISCONTINUED | OUTPATIENT
Start: 2021-12-16 | End: 2021-12-17

## 2021-12-16 RX ORDER — DEXTROSE 50 % IN WATER 50 %
50 SYRINGE (ML) INTRAVENOUS ONCE
Refills: 0 | Status: COMPLETED | OUTPATIENT
Start: 2021-12-16 | End: 2021-12-16

## 2021-12-16 RX ADMIN — Medication 150 MILLILITER(S): at 23:52

## 2021-12-16 RX ADMIN — Medication 50 MILLILITER(S): at 23:38

## 2021-12-16 NOTE — ED PROVIDER NOTE - PATIENT PORTAL LINK FT
You can access the FollowMyHealth Patient Portal offered by [facility] by registering at the following website: [email or web address]. By joining [employer]’s FollowMyHealth portal, you will also be able to view your health information using other applications (apps) compatible with our system.

## 2021-12-16 NOTE — ED PROVIDER NOTE - CARE PROVIDER_API CALL
Latrell Mcmillan)  EndocrinologyMetabDiabetes; Internal Medicine  [address] [place] [address]  Phone: [phone]  Fax: [phone]  Follow Up Time:
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
## 2021-12-16 NOTE — ED PROVIDER NOTE - CARE PLAN
1 Principal Discharge DX:	Hypoglycemia  Secondary Diagnosis:	Altered mental status   Principal Discharge DX:	Hypoglycemia  Secondary Diagnosis:	Altered mental status  Secondary Diagnosis:	Polysubstance abuse

## 2021-12-16 NOTE — ED PROVIDER NOTE - PROGRESS NOTE DETAILS
FS downtrended significantly after D10gtt d/c'd and oral repletion given with persistent hypoglycemia, pt reports no h/o hypoglycemia or pancreatitic issues, +alcoholic but reports only have a few beer yesterday and no prolonged fasting noted. Started on paxil 10mg yesterday, but otherwise no new meds Upon further discussion of dispo plan, pt declined admission to [facility] due to concerns for methadone dependence.  Various options and alternatives discussed, pt has decided to leave the emergency department against medical advice. Risks, benefits, alternatives, and potential consequences of current condition (including the possibility of worsening of disease, pain, permanent disability, and/or death) have been fully discussed and explained to pt and family in detail at bedside.  Pt has had the chance to ask questions, verbalized understanding of the aforementioned and still wishes to sign out against medical advice. The patient is awake, alert, oriented  x 4 and has demonstrated capacity to refuse/direct care. Strict return precautions discussed at bedside. Pt was informed to return to ER at any time should he changes his mind or has worsening sx or concerns. Upon further discussion of dispo plan, pt declined admission to [facility] due to concerns for methadone dependence.  Various options and alternatives discussed by various providers and RN at bedside.  Pt reassured that methadone could be verified inpatient and dispensed appropriately but pt has decided to leave the emergency department against medical advice. Risks, benefits, alternatives, and potential consequences of current condition (including the possibility of worsening of disease, pain, permanent disability, and/or death) have been fully discussed and explained to pt in detail at bedside.  Pt has had the chance to ask questions, verbalized understanding of the aforementioned and still wishes to sign out against medical advice. The patient is awake, alert, oriented  x 4 and has demonstrated capacity to refuse/direct care. Strict return precautions discussed at bedside. Pt was given additional dose of D50% and oral supplementation prior to leaving ER.  Informed to return to ER at any time should he changes his mind or has worsening sx or concerns. FS downtrended significantly after D10gtt d/c'd and oral repletion given with persistent hypoglycemia, pt reports no h/o hypoglycemia or pancreatitic issues or DM, not on any sulfonylurea/meglitinide; +alcoholic but reports only have a few beer yesterday and no prolonged fasting noted. Started on paxil 10mg yesterday, but otherwise no new meds

## 2021-12-16 NOTE — ED PROVIDER NOTE - ATTENDING CONTRIBUTION TO CARE
patient with persistant hypoglycemia despite boluses of dextrose, and d10 drip, as well as po challenges. patient offered admission for full work up with likely endocrinology evaluation, declining admission, awake, coherent, alert and oriented, agree with acp documentation and management. patient appears to have insight and capacity. discharge paper work given, patient to go to his methadone clinic and then return to ED. ambulatory upon dc, well appearing.

## 2021-12-16 NOTE — ED PROVIDER NOTE - CLINICAL SUMMARY MEDICAL DECISION MAKING FREE TEXT BOX
pt here for AMS 2/2 polysubstance use and found to be hypoglycemic to 54, s/p oral repletion and serial FS with uptrending FS and improved mental status, monitored in the ED with improved mental status, AFVSS, currently ambulatory with steady gait, tolerating PO without N/V, clear speech, without additional medical complaints noted.  Repeat exam and neuro/cranial nerve exams wnl.  No evidence of head/neck trauma. Pt feels much better and safe for discharge. Counseling provided. Medically stable for d/c. pt here for AMS 2/2 polysubstance use and found to be hypoglycemic to 54, s/p oral and IV repletion and serial FS with uptrending FS and improved mental status, monitored in the ED with improved mental status, AFVSS, currently ambulatory with steady gait, tolerating PO without N/V, clear speech, without additional medical complaints noted.  Repeat exam and neuro/cranial nerve exams wnl.  No evidence of head/neck trauma. Pt feels much better and safe for discharge. Counseling provided. Medically stable for d/c. pt here for AMS 2/2 polysubstance use and found to be hypoglycemic to 54, s/p oral and IV repletion with improvement in serial FS initially. Noted gradual downtrending FS despite monitoring in the ED with oral complex carbohydrate repletion, pt here for AMS 2/2 polysubstance use and found to be hypoglycemic to 54, s/p oral and IV repletion with improvement in serial FS initially. Noted gradual downtrending FS despite monitoring in the ED with oral complex carbohydrate repletion.  New onset hypoglycemia with unknown source, requiring d10gtt, will admit to [facility] for further w/u and monitoring pt here for AMS 2/2 polysubstance use and found to be hypoglycemic to 54, s/p oral and IV repletion with improvement in serial FS initially. Noted gradual downtrending FS despite monitoring in the ED with oral complex carbohydrate repletion.  New onset hypoglycemia with unknown source, requiring prolonged d10gtt and close monitoring of FS, [facility] and monitoring pt here for AMS 2/2 polysubstance use and found to be hypoglycemic to 54, s/p oral and IV repletion with improvement in serial FS initially. Noted gradual downtrending FS despite monitoring in the ED with oral complex carbohydrate repletion and starting on d10 gtt. Precipitates drop in serum glucose once D10gtt d/c'd despite oral repletion. New onset hypoglycemia with unknown etiology, requiring prolonged d10gtt and close monitoring of FS, prior CT A/P from 08/2021 with no acute intraabdominal / pancreatitic abnormalities noted; likely less medication induced hypoglycemia, r/o insulinoma, will admit to [facility] for further w/u and monitoring

## 2021-12-16 NOTE — ED PROVIDER NOTE - PHYSICAL EXAMINATION
Gen - WDWN M, +AOB, lethargic but arousable by verbal stimuli  Skin - warm, dry, intact  HEENT - AT/NC, PERRL, mild conjunctival injection, pupils 3mm b/l, TM intact with no hemotympanium b/l, no facial contusion or periorbital ecchymosis, o/p clear, uvula midline, airway patent, neck supple with no step off or midline tenderness, FROM   CV - S1S2, R/R/R  Resp - respiration non-labored, CTAB, symmetric bs b/l, no r/r/w  GI - NABS, soft, ND, NT, no rebound or guarding, no CVAT b/l  MS - moving all extremities, no c/c/e   Neuro - Lethargic but arousable by verbal stimuli, garble speech and unsteady gait Gen - WDWN M, +AOB, lethargic but arousable by verbal stimuli  Skin - warm, dry, intact  HEENT - AT/NC, PERRL, no conjunctival injection, pupils 3mm b/l, TM intact with no hemotympanium b/l, no facial contusion or periorbital ecchymosis, o/p clear, uvula midline, airway patent, neck supple with no step off or midline tenderness, FROM   CV - S1S2, R/R/R  Resp - respiration non-labored, CTAB, symmetric bs b/l, no r/r/w  GI - NABS, soft, ND, NT, no rebound or guarding, no CVAT b/l  MS - moving all extremities, no c/c/e   Neuro - Lethargic but arousable by verbal stimuli, garble speech and unsteady gait

## 2021-12-17 VITALS
RESPIRATION RATE: 16 BRPM | SYSTOLIC BLOOD PRESSURE: 128 MMHG | HEART RATE: 68 BPM | OXYGEN SATURATION: 99 % | DIASTOLIC BLOOD PRESSURE: 74 MMHG

## 2021-12-17 PROBLEM — Z87.898 PERSONAL HISTORY OF OTHER SPECIFIED CONDITIONS: Chronic | Status: ACTIVE | Noted: 2021-08-30

## 2021-12-17 LAB
ALBUMIN SERPL ELPH-MCNC: 3.7 G/DL — SIGNIFICANT CHANGE UP (ref 3.4–5)
ALP SERPL-CCNC: 115 U/L — SIGNIFICANT CHANGE UP (ref 40–120)
ALT FLD-CCNC: 43 U/L — HIGH (ref 12–42)
ANION GAP SERPL CALC-SCNC: 11 MMOL/L — SIGNIFICANT CHANGE UP (ref 9–16)
ANION GAP SERPL CALC-SCNC: 13 MMOL/L — SIGNIFICANT CHANGE UP (ref 9–16)
AST SERPL-CCNC: 98 U/L — HIGH (ref 15–37)
BASOPHILS # BLD AUTO: 0 K/UL — SIGNIFICANT CHANGE UP (ref 0–0.2)
BASOPHILS NFR BLD AUTO: 0 % — SIGNIFICANT CHANGE UP (ref 0–2)
BILIRUB SERPL-MCNC: 0.2 MG/DL — SIGNIFICANT CHANGE UP (ref 0.2–1.2)
BUN SERPL-MCNC: 10 MG/DL — SIGNIFICANT CHANGE UP (ref 7–23)
BUN SERPL-MCNC: 8 MG/DL — SIGNIFICANT CHANGE UP (ref 7–23)
CALCIUM SERPL-MCNC: 8.4 MG/DL — LOW (ref 8.5–10.5)
CALCIUM SERPL-MCNC: 9 MG/DL — SIGNIFICANT CHANGE UP (ref 8.5–10.5)
CHLORIDE SERPL-SCNC: 100 MMOL/L — SIGNIFICANT CHANGE UP (ref 96–108)
CHLORIDE SERPL-SCNC: 97 MMOL/L — SIGNIFICANT CHANGE UP (ref 96–108)
CO2 SERPL-SCNC: 26 MMOL/L — SIGNIFICANT CHANGE UP (ref 22–31)
CO2 SERPL-SCNC: 26 MMOL/L — SIGNIFICANT CHANGE UP (ref 22–31)
CREAT SERPL-MCNC: 0.75 MG/DL — SIGNIFICANT CHANGE UP (ref 0.5–1.3)
CREAT SERPL-MCNC: 0.88 MG/DL — SIGNIFICANT CHANGE UP (ref 0.5–1.3)
EOSINOPHIL # BLD AUTO: 0.03 K/UL — SIGNIFICANT CHANGE UP (ref 0–0.5)
EOSINOPHIL NFR BLD AUTO: 1 % — SIGNIFICANT CHANGE UP (ref 0–6)
ETHANOL SERPL-MCNC: 135 MG/DL — HIGH
GLUCOSE BLDC GLUCOMTR-MCNC: 146 MG/DL — HIGH (ref 70–99)
GLUCOSE BLDC GLUCOMTR-MCNC: 155 MG/DL — HIGH (ref 70–99)
GLUCOSE BLDC GLUCOMTR-MCNC: 251 MG/DL — HIGH (ref 70–99)
GLUCOSE BLDC GLUCOMTR-MCNC: 78 MG/DL — SIGNIFICANT CHANGE UP (ref 70–99)
GLUCOSE BLDC GLUCOMTR-MCNC: 86 MG/DL — SIGNIFICANT CHANGE UP (ref 70–99)
GLUCOSE BLDC GLUCOMTR-MCNC: 88 MG/DL — SIGNIFICANT CHANGE UP (ref 70–99)
GLUCOSE SERPL-MCNC: 45 MG/DL — CRITICAL LOW (ref 70–99)
GLUCOSE SERPL-MCNC: 65 MG/DL — LOW (ref 70–99)
LYMPHOCYTES # BLD AUTO: 1.7 K/UL — SIGNIFICANT CHANGE UP (ref 1–3.3)
LYMPHOCYTES # BLD AUTO: 57 % — HIGH (ref 13–44)
MAGNESIUM SERPL-MCNC: 2.5 MG/DL — SIGNIFICANT CHANGE UP (ref 1.6–2.6)
MONOCYTES # BLD AUTO: 0.24 K/UL — SIGNIFICANT CHANGE UP (ref 0–0.9)
MONOCYTES NFR BLD AUTO: 8 % — SIGNIFICANT CHANGE UP (ref 2–14)
NEUTROPHILS # BLD AUTO: 0.98 K/UL — LOW (ref 1.8–7.4)
NEUTROPHILS NFR BLD AUTO: 33 % — LOW (ref 43–77)
NRBC # BLD: SIGNIFICANT CHANGE UP /100 WBCS (ref 0–0)
PCO2 BLDV: 41 MMHG — LOW (ref 42–55)
PCP SPEC-MCNC: SIGNIFICANT CHANGE UP
PH BLDV: 7.41 — SIGNIFICANT CHANGE UP (ref 7.32–7.43)
PO2 BLDV: 77 MMHG — HIGH (ref 25–45)
POTASSIUM SERPL-MCNC: 3.5 MMOL/L — SIGNIFICANT CHANGE UP (ref 3.5–5.3)
POTASSIUM SERPL-MCNC: 3.7 MMOL/L — SIGNIFICANT CHANGE UP (ref 3.5–5.3)
POTASSIUM SERPL-SCNC: 3.5 MMOL/L — SIGNIFICANT CHANGE UP (ref 3.5–5.3)
POTASSIUM SERPL-SCNC: 3.7 MMOL/L — SIGNIFICANT CHANGE UP (ref 3.5–5.3)
PROT SERPL-MCNC: 8.4 G/DL — HIGH (ref 6.4–8.2)
SAO2 % BLDV: 97.2 % — HIGH (ref 67–88)
SARS-COV-2 RNA SPEC QL NAA+PROBE: SIGNIFICANT CHANGE UP
SODIUM SERPL-SCNC: 136 MMOL/L — SIGNIFICANT CHANGE UP (ref 132–145)
SODIUM SERPL-SCNC: 137 MMOL/L — SIGNIFICANT CHANGE UP (ref 132–145)

## 2021-12-17 PROCEDURE — 71045 X-RAY EXAM CHEST 1 VIEW: CPT | Mod: 26

## 2021-12-17 RX ORDER — DEXTROSE 50 % IN WATER 50 %
50 SYRINGE (ML) INTRAVENOUS ONCE
Refills: 0 | Status: COMPLETED | OUTPATIENT
Start: 2021-12-17 | End: 2021-12-17

## 2021-12-17 RX ORDER — DEXTROSE 10 % IN WATER 10 %
1000 INTRAVENOUS SOLUTION INTRAVENOUS
Refills: 0 | Status: DISCONTINUED | OUTPATIENT
Start: 2021-12-17 | End: 2021-12-20

## 2021-12-17 RX ADMIN — Medication 50 MILLILITER(S): at 07:26

## 2021-12-17 RX ADMIN — Medication 50 MILLIGRAM(S): at 06:35

## 2021-12-17 RX ADMIN — Medication 100 MILLILITER(S): at 04:54

## 2021-12-17 NOTE — ED ADULT NURSE REASSESSMENT NOTE - NS ED NURSE REASSESS COMMENT FT1
AS per PAULINO Bearden pt. will be leaving AMA so that he could go to his methadone clinic for medication. Pt. understands the risks of leaving the hospital. Pt. states that right after the clinic he will check himself into [facility].
Pt is aox4, pleasant, calm and compliant with all care. FS remain on the low side even after multiple foods and juices. Iv inserted to right forearm 20G patent and intact free of redness swelling or abnormalities. Medicated as per order. Will reassess bgl accordingly.
Pt report received from previous RN, IVF Dex10% infusing at 150ml/hr. Pt resting alert and orient. Will recheck blood sugar at 0030 and adjust fluid rate to appropriate, as discussed with PA.
Pt. received resting in stretcher with eyes closed easily arousable to voice, AAOx3. Pt. is breathing at ease on room air with visible chest rise. 20g to RAC with D10 infusing via IV pump at 100ml/hr, no redness, swelling, or tenderness noted. Fall precautions in place, stretcher alarm on and hourly rounding implemented. Awaiting sobriety for discharge. Monitoring ongoing.
Pt sleeping comfortably in stretcher and easily rousable to verbal and tactile stimuli. Repeat fs 78mg/dL. Given more ginger ale and pb n j. Safety precautions in place and maintained.

## 2022-01-02 NOTE — ED ADULT NURSE NOTE - DOES PATIENT HAVE ADVANCE DIRECTIVE
This is a 89y/o male with PMH of HTN, Hld, Ashd (stents x 14) ? Atrial fib and RA who presents with chest discomfort.  Patient has been having intermittent chest pain for several day. ROBBIE ordonez was hosp at [facility] from 12/28-12/30 and observed and discharged home chest pain free .  Then on 12/31 patient had >2 hours of chest discomfort severe when it went away he felt much better till this am when he bagan to experience chest pain and presents to er after wife called 911.  Presents in ER with chest discomfort which he received plavix and nitro and pain is much better now.  Code stemi had been activated and he was referred to the cath lab  Patient states he is allergic to plavix but him and his wife does not know reaction he gets.    STEMI   Asa plavix  Pravachol   This is a 89y/o male with PMH of HTN, Hld, Ashd (stents x 14) ? Atrial fib and RA who presents with chest discomfort.  Patient has been having intermittent chest pain for several day. ROBBIE ordonez was hosp at [facility] from 12/28-12/30 and observed and discharged home chest pain free .  Then on 12/31 patient had >2 hours of chest discomfort severe when it went away he felt much better till this am when he bagan to experience chest pain and presents to er after wife called 911.  Presents in ER with chest discomfort which he received plavix and nitro and pain is much better now.  Code stemi had been activated and he was referred to the cath lab  Patient states he is allergic to plavix but him and his wife does not know reaction he gets.  Now s/p athrectomy and stent to RCA  see above for full report     STEMI   Asa plavix  Pravachol    Right radial arm neuro vasc checks  Admit to ICU  Telemetry monitoring  Ekg in am  Cbc bmp in the am  Life style changes  Cardiac rehab discussed with patient       This is a 89y/o male with PMH of HTN, Hld, Ashd (stents x 14), Atrial fib and RA who presents with chest discomfort.  Patient has been having intermittent chest pain for several day. H mery was hosp at [facility] from 12/28-12/30 and observed and discharged home chest pain free .  Then on 12/31 patient had >2 hours of chest discomfort severe when it went away he felt much better till this am when he bagan to experience chest pain and presents to er after wife called 911.  Presents in ER with chest discomfort which he received plavix and nitro and pain is much better now.  Code stemi had been activated and he was referred to the cath lab  Patient states he is allergic to plavix but him and his wife does not know reaction he gets.  Now s/p athrectomy and stent to RCA  see above for full report     STEMI   Asa plavix  Pravachol    Right radial arm neuro vasc checks  Admit to ICU  Telemetry monitoring  Ekg in am  Cbc bmp in the am  Life style changes  Cardiac rehab discussed with patient       This is a 89y/o male with PMH of HTN, Hld, Ashd (stents x 14), Atrial fib and RA who presents with chest discomfort.  Patient has been having intermittent chest pain for several day. ROBBIE ordonez was hosp at [facility] from 12/28-12/30 and observed and discharged home chest pain free .  Then on 12/31 patient had >2 hours of chest discomfort severe when it went away he felt much better till this am when he bagan to experience chest pain and presents to er after wife called 911.  Presents in ER with chest discomfort which he received plavix and nitro and pain is much better now.  Code stemi had been activated and he was referred to the cath lab  Patient states he is allergic to plavix but him and his wife does not know reaction he gets.  Now s/p athrectomy and stent to RCA  see above for full report     STEMI s/p RCA stent  Admit to ICU  Has residual LAD dx and will need intervention for in stent stenosis  [facility] to consult and provide further intervention  Trop is pending  Cxr is pending  Asa plavix zocar  Eliquis may be started tonight if no bleeding from radial site  Right radial arm neuro vasc checks  Ekg in am  Cbc bmp in the am  Life style changes  Cardiac rehab discussed with patient       This is a 87y/o male with PMH of HTN, Hld, Ashd (stents x 14), Atrial fib and RA who presents with chest discomfort.  Patient has been having intermittent chest pain for several day. ROBBIE ordonez was hosp at [facility] from 12/28-12/30 and observed and discharged home chest pain free .  Then on 12/31 patient had >2 hours of chest discomfort severe when it went away he felt much better till this am when he bagan to experience chest pain and presents to er after wife called 911.  Presents in ER with chest discomfort which he received plavix and nitro and pain is much better now.  Code stemi had been activated and he was referred to the cath lab  Patient states he is allergic to plavix but him and his wife does not know reaction he gets.  Now s/p athrectomy and stent to RCA  see above for full report     STEMI s/p RCA stent  Admit to ICU  Has residual LAD dx and will need intervention for in stent stenosis  [facility] to consult and provide further intervention  Trop is pending  Cxr is pending  Asa plavix zocar  Eliquis may be started tonight if no bleeding from radial site  restart ace in am if bp is ok  Right radial arm neuro vasc checks  Ekg in am  Cbc bmp in the am  Life style changes  Cardiac rehab discussed with patient       This is a 87y/o male with PMH of HTN, Hld, Ashd (stents x 14), Atrial fib and RA who presents with chest discomfort.  Patient has been having intermittent chest pain for several day. ROBBIE ordonez was hosp at [facility] from 12/28-12/30 and observed and discharged home chest pain free .  Then on 12/31 patient had >2 hours of chest discomfort severe when it went away he felt much better till this am when he bagan to experience chest pain and presents to er after wife called 911.  Presents in ER with chest discomfort which he received plavix and nitro and pain is much better now.  Code stemi had been activated and he was referred to the cath lab  Patient states he is allergic to plavix but him and his wife does not know reaction he gets.  Now s/p athrectomy and stent to RCA  see above for full report     STEMI s/p RCA stent  Admit to ICU  Has residual LAD dx and will need intervention for in stent stenosis  [facility] to consult and provide further intervention  Trop is pending  Cxr is pending  Asa plavix zocar  Eliquis may be started tonight if no bleeding from radial site  restart ace in am if bp is ok  Right radial arm neuro vasc checks  Ekg in am  Cbc bmp in the am  Life style changes  Cardiac rehab discussed with patient  cardiac rehab info provided/referral and communication to cardiac rehab completed       No

## 2022-06-26 ENCOUNTER — EMERGENCY (EMERGENCY)
Facility: HOSPITAL | Age: 52
LOS: 1 days | Discharge: HOME | End: 2022-06-26
Attending: EMERGENCY MEDICINE | Admitting: EMERGENCY MEDICINE
Payer: MEDICAID

## 2022-06-26 VITALS
HEART RATE: 96 BPM | OXYGEN SATURATION: 96 % | HEIGHT: 73 IN | SYSTOLIC BLOOD PRESSURE: 114 MMHG | WEIGHT: 164.91 LBS | TEMPERATURE: 98 F | RESPIRATION RATE: 16 BRPM | DIASTOLIC BLOOD PRESSURE: 77 MMHG

## 2022-06-26 DIAGNOSIS — Z96.7 PRESENCE OF OTHER BONE AND TENDON IMPLANTS: Chronic | ICD-10-CM

## 2022-06-26 PROCEDURE — 71101 X-RAY EXAM UNILAT RIBS/CHEST: CPT | Mod: 26,RT

## 2022-06-26 PROCEDURE — 99284 EMERGENCY DEPT VISIT MOD MDM: CPT | Mod: 25

## 2022-06-26 PROCEDURE — 71101 X-RAY EXAM UNILAT RIBS/CHEST: CPT | Mod: 26,LT

## 2022-06-26 RX ORDER — IBUPROFEN 200 MG
600 TABLET ORAL ONCE
Refills: 0 | Status: COMPLETED | OUTPATIENT
Start: 2022-06-26 | End: 2022-06-26

## 2022-06-26 RX ADMIN — Medication 600 MILLIGRAM(S): at 17:23

## 2022-06-26 NOTE — ED PROVIDER NOTE - NSFOLLOWUPINSTRUCTIONS_ED_ALL_ED_FT
pain control for rib fracture - ibuprofen or tylenol     stay well hydrated     return to ER for shortness of breath, fever or any other concern

## 2022-06-26 NOTE — ED PROVIDER NOTE - PATIENT PORTAL LINK FT
You can access the FollowMyHealth Patient Portal offered by [facility] by registering at the following website: [email or web address]. By joining [employer]’s FollowMyHealth portal, you will also be able to view your health information using other applications (apps) compatible with our system.

## 2022-06-26 NOTE — ED PROVIDER NOTE - OBJECTIVE STATEMENT
50 yo male c/o right chest wall tendenress s/p fall down steps 3 days ago    no head / neck / back trauma or pain  no headache no sob no abd pain   no motor or sensory changes

## 2022-06-26 NOTE — ED PROVIDER NOTE - MUSCULOSKELETAL, MLM
+ right chest wall tenderness lower lateral ribs. skin intact . no ecchymosis No cervical, thoracic ot lumbar spine tenderness to percussion or palpation. Flexion/extension of spine without pain.

## 2022-06-28 DIAGNOSIS — S22.31XA FRACTURE OF ONE RIB, RIGHT SIDE, INITIAL ENCOUNTER FOR CLOSED FRACTURE: ICD-10-CM
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
## 2022-09-27 ENCOUNTER — EMERGENCY (EMERGENCY)
Facility: HOSPITAL | Age: 52
LOS: 1 days | Discharge: HOME | End: 2022-09-27
Attending: EMERGENCY MEDICINE | Admitting: EMERGENCY MEDICINE

## 2022-09-27 VITALS
SYSTOLIC BLOOD PRESSURE: 112 MMHG | HEIGHT: 74 IN | DIASTOLIC BLOOD PRESSURE: 76 MMHG | WEIGHT: 174.17 LBS | OXYGEN SATURATION: 99 % | HEART RATE: 95 BPM | TEMPERATURE: 98 F | RESPIRATION RATE: 15 BRPM

## 2022-09-27 VITALS
DIASTOLIC BLOOD PRESSURE: 76 MMHG | OXYGEN SATURATION: 100 % | SYSTOLIC BLOOD PRESSURE: 109 MMHG | RESPIRATION RATE: 18 BRPM | HEART RATE: 85 BPM

## 2022-09-27 DIAGNOSIS — Z96.7 PRESENCE OF OTHER BONE AND TENDON IMPLANTS: Chronic | ICD-10-CM

## 2022-09-27 PROCEDURE — 73630 X-RAY EXAM OF FOOT: CPT | Mod: 26,RT

## 2022-09-27 PROCEDURE — 99283 EMERGENCY DEPT VISIT LOW MDM: CPT | Mod: 25

## 2022-09-27 RX ORDER — CEPHALEXIN 500 MG
500 CAPSULE ORAL ONCE
Refills: 0 | Status: COMPLETED | OUTPATIENT
Start: 2022-09-27 | End: 2022-09-27

## 2022-09-27 RX ORDER — CEPHALEXIN 500 MG
1 CAPSULE ORAL
Qty: 40 | Refills: 0
Start: 2022-09-27 | End: 2022-10-06

## 2022-09-27 RX ORDER — IBUPROFEN 200 MG
1 TABLET ORAL
Qty: 20 | Refills: 0
Start: 2022-09-27 | End: 2022-10-01

## 2022-09-27 RX ORDER — IBUPROFEN 200 MG
600 TABLET ORAL ONCE
Refills: 0 | Status: COMPLETED | OUTPATIENT
Start: 2022-09-27 | End: 2022-09-27

## 2022-09-27 RX ADMIN — Medication 600 MILLIGRAM(S): at 18:33

## 2022-09-27 RX ADMIN — Medication 1 TABLET(S): at 18:33

## 2022-09-27 RX ADMIN — Medication 500 MILLIGRAM(S): at 18:33

## 2022-09-27 NOTE — ED PROVIDER NOTE - PHYSICAL EXAMINATION
VITAL SIGNS: I have reviewed nursing notes and confirm.  CONSTITUTIONAL: Well-developed; well-nourished; in no acute distress.  SKIN: On the plantar aspect of the R foot is a 3x3 cm area of swelling and tenderness w/o fluctuation, no violation of skin. Rest of foot exam within normal limits.  HEAD: Normocephalic; atraumatic.  EYES: PERRL, EOM intact; conjunctiva and sclera clear.  ENT: No nasal discharge; airway clear.  NECK: Supple; non tender.  CARD: Regular rate and rhythm.  RESP: No respiratory distress.  ABD: Soft; non-distended; non-tender.  MSK: Normal ROM. No clubbing, cyanosis or edema.  NEURO: Alert, oriented. Grossly unremarkable.  PSYCH: Cooperative, appropriate.

## 2022-09-27 NOTE — ED PROVIDER NOTE - OBJECTIVE STATEMENT
52 yo M presenting for pain and swelling to the plantar aspect of the R foot for 2-3 days. Pt describes a throbbing type of pain progressively getting worse. No hx of trauma or knowledge of any FB. No hx of foot or skin infections. No hx of DM. Believes it's infected. No fevers/chills.

## 2022-09-27 NOTE — ED ADULT NURSE NOTE - OBJECTIVE STATEMENT
Pt presents to ED complaining of painful cyst/abcess to the ball of the right foot noticed yesterday. Pt endorses pus production. Denies stepping on foreign body. Denies fevers body aches chills. Not draining at time of assessment. Ambulatory with cane. Hx of HTN.

## 2022-09-27 NOTE — ED PROVIDER NOTE - PATIENT PORTAL LINK FT
You can access the FollowMyHealth Patient Portal offered by [facility] by registering at the following website: [email or web address]. By joining [employer]’s FollowMyHealth portal, you will also be able to view your health information using other applications (apps) compatible with our system.

## 2022-09-27 NOTE — ED ADULT NURSE NOTE - NSIMPLEMENTINTERV_GEN_ALL_ED
Implemented All Fall Risk Interventions:  [place] to call system. Call bell, personal items and telephone within reach. Instruct patient to call for assistance. Room bathroom lighting operational. Non-slip footwear when patient is off stretcher. Physically safe environment: no spills, clutter or unnecessary equipment. Stretcher in lowest position, wheels locked, appropriate side rails in place. Provide visual cue, wrist band, yellow gown, etc. Monitor gait and stability. Monitor for mental status changes and reorient to person, place, and time. Review medications for side effects contributing to fall risk. Reinforce activity limits and safety measures with patient and family.

## 2022-09-27 NOTE — ED PROVIDER NOTE - NSFOLLOWUPINSTRUCTIONS_ED_ALL_ED_FT
Cellulitis    WHAT YOU NEED TO KNOW:    Cellulitis is a skin infection caused by bacteria. Cellulitis is common and can become severe. Cellulitis usually appears on the lower legs. It can also appear on the arms, face, and other areas. Cellulitis develops when bacteria enter a crack or break in your skin, such as a scratch, bite, or cut.  Cellulitis          DISCHARGE INSTRUCTIONS:    Return to the emergency department if:   •Your wound gets larger and more painful.      •You feel a crackling under your skin when you touch it.      •You have purple dots or bumps on your skin, or you see bleeding under your skin.      •You see red streaks coming from the infected area.      Call your doctor if:   •The red, warm, swollen area gets larger.      •Your fever or pain does not go away or gets worse.      •The area does not get smaller after 3 days of antibiotics.      •You have questions or concerns about your condition or care.      Medicines: You should start to see improvement in 3 days. If cellulitis is not treated, the infection can spread through your body and become life-threatening. You may need any of the following medicines:  •Antibiotics help treat a bacterial infection.      •Acetaminophen decreases pain and fever. It is available without a doctor's order. Ask how much to take and how often to take it. Follow directions. Read the labels of all other medicines you are using to see if they also contain acetaminophen, or ask your doctor or pharmacist. Acetaminophen can cause liver damage if not taken correctly.      •NSAIDs, such as ibuprofen, help decrease swelling, pain, and fever. This medicine is available with or without a doctor's order. NSAIDs can cause stomach bleeding or kidney problems in certain people. If you take blood thinner medicine, always ask your healthcare provider if NSAIDs are safe for you. Always read the medicine label and follow directions.      •Take your medicine as directed. Contact your healthcare provider if you think your medicine is not helping or if you have side effects. Tell your provider if you are allergic to any medicine. Keep a list of the medicines, vitamins, and herbs you take. Include the amounts, and when and why you take them. Bring the list or the pill bottles to follow-up visits. Carry your medicine list with you in case of an emergency.      Self-care:   •Wash the area with soap and water every day. Gently pat dry. Use bandages if directed by your healthcare provider.      •Apply cream or ointment as directed. These help protect the area. Most over-the-counter products, such as petroleum jelly, are good to use. Ask your healthcare provider about specific creams or ointments you should use.      •Place a cool, damp cloth on the area. Use clean cloths and clean water. You can do this as often as you need to. Cool, damp cloths may help decrease pain.      •Elevate the area above the level of your heart as often as you can. This will help decrease swelling and pain. Prop the area on pillows or blankets to keep it elevated comfortably.  Elevate Leg           Prevent cellulitis:   •Do not scratch bug bites or areas of injury. You increase your risk for cellulitis by scratching these areas.      •Do not share personal items, such as towels, clothing, and razors.      •Clean exercise equipment with germ-killing  before and after you use it.      •Treat athlete’s foot. This can help prevent the spread of a bacterial skin infection.      •Wash your hands often. Use soap and water. Wash your hands after you use the bathroom, change a child's diapers, or sneeze. Wash your hands before you prepare or eat food. Use lotion to prevent dry, cracked skin.  Handwashing           Follow up with your doctor within 3 days, or as directed: He or she will check if your cellulitis is getting better. Write down your questions so you remember to ask them during your visits.

## 2022-09-27 NOTE — ED PROVIDER NOTE - CLINICAL SUMMARY MEDICAL DECISION MAKING FREE TEXT BOX
Arrange for 30-day monitor and then follow-up afterward.   Plan for X-ray R foot, antibiotics, and pain control. Plan for X-ray R foot, antibiotics, and pain control.    No FB or acute hien abnormalities on x-ray, stared on abx in the ED, given boot and cane to assist in ambulation. Discussed option to I&D, but as there is no area of fluctuance or discernable drainable collection, opting to c/w abx for 48hrs and return for wound check. Given return precautions to return prior to 48hr geovanna if pain is worsening or if pt develops fever/any other new sx.

## 2022-09-29 DIAGNOSIS — L08.9 LOCAL INFECTION OF THE SKIN AND SUBCUTANEOUS TISSUE, UNSPECIFIED: ICD-10-CM

## 2022-09-29 DIAGNOSIS — M79.671 PAIN IN RIGHT FOOT: ICD-10-CM

## 2022-10-20 NOTE — ED PROVIDER NOTE - RADIOLOGY FINDINGS
· Assessment	  54 y/o female PMH of HTN, HLD, hypothyroidism, constipation, CAD, bedbound, ESRD on HD sent in for leukocytosis admitted for infectious workup      Problem/Plan - 1:  ·  Problem: Bacteremia.  Plan: bacteremia with gram positives as drawn in dialysis   repeat cultures in proccess   pt hemodynamically stable  post vanco in ED  ID following   follow repeat cultures.   repeat vanco post HD as needed      Problem/Plan - 2:  ·  Problem: ESRD  on dialysis.       -on HD MWF  -renal following   -plan for HD today     Problem/Plan - 3:  ·  Problem: Sacral decubitus ulcer, stage IV.    - local Wound care   turn and position in this pt with bedbound status      Problem/Plan - 4:  ·  Problem: Hypothyroidism.  Plan: -c/w synthroid.      Problem/Plan - 5:  ·  Problem: CAD in native artery.  Plan: -c/w simvastatin, plavix, lopressor.      Problem/Plan - 6:  Problem: Essential hypertension.   - Continue Current medications and monitor. reviewed by me yes

## 2023-06-28 ENCOUNTER — EMERGENCY (EMERGENCY)
Facility: HOSPITAL | Age: 53
LOS: 1 days | Discharge: HOME | End: 2023-06-28
Admitting: EMERGENCY MEDICINE
Payer: MEDICAID

## 2023-06-28 VITALS
OXYGEN SATURATION: 96 % | RESPIRATION RATE: 18 BRPM | HEART RATE: 86 BPM | DIASTOLIC BLOOD PRESSURE: 85 MMHG | HEIGHT: 73 IN | SYSTOLIC BLOOD PRESSURE: 124 MMHG | TEMPERATURE: 98 F | WEIGHT: 179.9 LBS

## 2023-06-28 VITALS
SYSTOLIC BLOOD PRESSURE: 131 MMHG | HEART RATE: 77 BPM | RESPIRATION RATE: 16 BRPM | TEMPERATURE: 98 F | DIASTOLIC BLOOD PRESSURE: 74 MMHG | OXYGEN SATURATION: 97 %

## 2023-06-28 DIAGNOSIS — Z96.7 PRESENCE OF OTHER BONE AND TENDON IMPLANTS: Chronic | ICD-10-CM

## 2023-06-28 PROCEDURE — 99283 EMERGENCY DEPT VISIT LOW MDM: CPT

## 2023-06-28 RX ADMIN — Medication 1 TABLET(S): at 18:25

## 2023-06-28 NOTE — ED PROVIDER NOTE - IV ALTEPLASE DOOR HIDDEN
Consult Date: 09/14/2021    This consult has been requested by Dr. Irvin for fungating breast mass.    Ms. Hernández is a 63-year-old female who presented to the Emergency Room on 09/13/2021 because of leg swelling.  The patient has a history of restless leg syndrome.  For last 2 months, she was getting worsening leg swelling.  Swelling had gone up all the way up to her groin.  While in the Emergency Room, she also mentioned that she had a breast mass.  Breast exam by ER physician reveals large fungating left breast mass.  Please see the pictures. Multiple investigations were done in the Emergency Room on 09/13/2021:    -WBC of 6.6, hemoglobin of 4.7 and platelet of 576.  MCV of 68.  -CMP revealed mildly low sodium, protein and albumin. Otherwise normal.  -Normal lactic acid.  -INR of 1.22.  -COVID-19 negative.    On further questioning, patient says that she has breast lump for at least 6 months.  It has been slowly growing and has become ulcerated. The patient denies any history of breast cancer or breast abnormality.    REVIEW OF SYSTEMS:  The patient said that she otherwise feels good.  No headache.  No dizziness.  No chest pain.  No shortness of breath at rest.  Some shortness of breath on exertion.  No abdominal pain, nausea or vomiting.  Denies any blood in the urine or stool.  No black stool.  She has been having leg swelling.  She also has some worsening restless leg syndrome symptoms. Mild fatigue.    All other review of systems negative.    ALLERGIES:  REVIEWED.    MEDICATIONS:  Reviewed.    PAST MEDICAL HISTORY:    1.  Restless leg syndrome.  2.  Cholecystectomy.    SOCIAL HISTORY:    -She smoked for a few years while in college. Quit more than 30 years ago.  -No alcohol use.    FAMILY HISTORY:  Denies any history of cancer in the family.    PHYSICAL EXAMINATION:    GENERAL:  She is alert, oriented x 3.  Not in any respiratory distress.  VITALS:  Reviewed.  Rest of systems not examined.    LABORATORY  DATA:  Reviewed.    IMPRESSION:    1.  A 63-year-old female with fungating left breast mass.  This is clinically consistent with breast cancer.  2.  Severe microcytic anemia.  This is secondary to oozing of blood from the ulcerated breast mass.  3.  Thrombocytosis, reactive.  4.  Worsening restless leg symptoms secondary to anemia and iron deficiency.    RECOMMENDATIONS:    1.  Discussed with the patient regarding fungating breast mass.  I explained to her that this is clinically consistent with breast cancer.    We need a tissue diagnosis.  Dr. Godwin from Surgery has seen the patient.  The patient will be having a punch biopsy done later today.  Results will be available in the next 3-5 days.  ER/GA and HER-2/elia may take a little longer.    2.  Discussed regarding staging workup.  CT chest, abdomen and pelvis has been ordered.  We will get a bone scan.  As outpatient, we will consider PET scan.  If there is any evidence of metastatic disease, we will also get a brain MRI.    3.  Discussed regarding treatment.  I explained to the patient that she will need systemic treatment.  Treatment decision will be made depending on ER/GA and HER-2/elia status.  Further discussion after the scans and pathology are available.    4.  She has severe anemia.  This is secondary to oozing of blood from breast cancer.  She has received blood transfusion.  Hemoglobin is better at 8.  MCV has also improved.    She should be transfused for hemoglobin below 7.    Iron studies are pending.  I am going to start her on ferrous sulfate.  Iron studies may not be very accurate as she already received a transfusion before iron studies were drawn.    5.  As she has microcytic anemia, she should also get a GI workup.  I would recommend EGD and colonoscopy.  This can be done as outpatient.    6.  The patient had few questions, which were all answered.  Case discussed with Dr. Jean.    Thanks for the consult.    TOTAL TIME SPENT:  60 minutes.   Time was spent in today's visit, review of chart/investigations today, documentations and communicating with other physicians.    Radha Sandoval MD        D: 2021   T: 2021   MT: ZHOU    Name:     ANNABELLE CONNELL  MRN:      [id number]        Account:      [id number]   :      1958           Consult Date: 2021     Document: [id number]     show

## 2023-06-28 NOTE — ED ADULT NURSE NOTE - NSFALLUNIVINTERV_ED_ALL_ED
Bed/Stretcher in lowest position, wheels locked, appropriate side rails in place/Call bell, personal items and telephone in reach/Instruct patient to call for assistance before getting out of bed/chair/stretcher/Non-slip footwear applied when patient is off stretcher/[place] to call system/Physically safe environment - no spills, clutter or unnecessary equipment/Purposeful proactive rounding/Room/bathroom lighting operational, light cord in reach

## 2023-06-28 NOTE — ED PROVIDER NOTE - OBJECTIVE STATEMENT
51 y/o male c/o right ear fullness and discomfort stating he is having trouble hearing out of that ear. Patient appears well NAD. Denies fever, chills,  abdominal pain, change in bowel function, flank pain, rash, HA, dizziness, SOB, CP, palpitations, diaphoresis, cough, and malaise.

## 2023-06-28 NOTE — ED PROVIDER NOTE - PATIENT PORTAL LINK FT
You can access the FollowMyHealth Patient Portal offered by [facility] by registering at the following website: [email or web address]. By joining [employer]’s FollowMyHealth portal, you will also be able to view your health information using other applications (apps) compatible with our system.

## 2023-07-01 DIAGNOSIS — Z87.09 PERSONAL HISTORY OF OTHER DISEASES OF THE RESPIRATORY SYSTEM: ICD-10-CM

## 2023-07-01 DIAGNOSIS — H66.91 OTITIS MEDIA, UNSPECIFIED, RIGHT EAR: ICD-10-CM

## 2023-07-27 NOTE — ED ADULT NURSE NOTE - TEMPLATE LIST FOR HEAD TO TOE ASSESSMENT
Head, normocephalic, atraumatic, Face, Face within normal limits, Ears, External ears within normal limits, Nose/Nasopharynx, External nose normal appearance, nares patent, no nasal discharge, Mouth and Throat, Oral cavity appearance normal, Lips, Appearance normal
General

## 2023-09-01 ENCOUNTER — EMERGENCY (EMERGENCY)
Facility: HOSPITAL | Age: 53
LOS: 1 days | Discharge: HOME | End: 2023-09-01
Admitting: EMERGENCY MEDICINE
Payer: MEDICAID

## 2023-09-01 VITALS
SYSTOLIC BLOOD PRESSURE: 111 MMHG | HEIGHT: 74 IN | RESPIRATION RATE: 16 BRPM | OXYGEN SATURATION: 95 % | HEART RATE: 105 BPM | DIASTOLIC BLOOD PRESSURE: 80 MMHG | TEMPERATURE: 99 F | WEIGHT: 173.06 LBS

## 2023-09-01 DIAGNOSIS — Z86.59 PERSONAL HISTORY OF OTHER MENTAL AND BEHAVIORAL DISORDERS: ICD-10-CM
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
## 2023-09-01 DIAGNOSIS — Z87.898 PERSONAL HISTORY OF OTHER SPECIFIED CONDITIONS: ICD-10-CM
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
## 2023-09-01 DIAGNOSIS — M54.9 DORSALGIA, UNSPECIFIED: ICD-10-CM

## 2023-09-01 DIAGNOSIS — S20.221A CONTUSION OF RIGHT BACK WALL OF THORAX, INITIAL ENCOUNTER: ICD-10-CM

## 2023-09-01 DIAGNOSIS — Z96.7 PRESENCE OF OTHER BONE AND TENDON IMPLANTS: Chronic | ICD-10-CM

## 2023-09-01 PROCEDURE — 72128 CT CHEST SPINE W/O DYE: CPT | Mod: 26

## 2023-09-01 PROCEDURE — 71250 CT THORAX DX C-: CPT | Mod: 26

## 2023-09-01 PROCEDURE — 99284 EMERGENCY DEPT VISIT MOD MDM: CPT

## 2023-09-01 RX ORDER — LIDOCAINE 4 G/100G
1 CREAM TOPICAL ONCE
Refills: 0 | Status: COMPLETED | OUTPATIENT
Start: 2023-09-01 | End: 2023-09-01

## 2023-09-01 RX ADMIN — LIDOCAINE 1 PATCH: 4 CREAM TOPICAL at 19:35

## 2023-09-01 NOTE — ED PROVIDER NOTE - PROGRESS NOTE DETAILS
Patient signed out to me by patient PA pending imaging  Patient is sitting comfortably in room, no acute stress  Preliminary reports show no acute fractures, however will await official reads.  Patient made aware. Imaging is unremarkable  Patient stable for discharge  Also history of the patient.  Patient or stands negative plan.  Agree to follow-up primary care doctor in 2 to 3 days.

## 2023-09-01 NOTE — ED ADULT NURSE NOTE - OBJECTIVE STATEMENT
Patient reports intermittent lower back pain x 1 hr s/p slip and fall. Denies any dizziness. Denies head strike. Pt free from and abd pain. No bruising or redness noted to area. Pt gait steady. Denies any difficulty urinating or blood in urine.

## 2023-09-01 NOTE — ED PROVIDER NOTE - PHYSICAL EXAMINATION
VITAL SIGNS: I have reviewed nursing notes and confirm.  CONSTITUTIONAL: Well-developed; well-nourished; in no acute distress.  SKIN: Skin is warm and dry, no acute rash.  HEAD: Normocephalic; atraumatic.  NECK: Supple; non tender.  CARD: S1, S2 normal; no murmurs, gallops, or rubs. Regular rate and rhythm.  RESP: No wheezes, rales or rhonchi.  SPINE: +ttp along T9-t12 w/ hematoma on R paraspinal region [at that level]; +ttp along the 11th and 12th lower posterior ribs.  EXT: Normal ROM. No clubbing, cyanosis or edema.  NEURO: Alert, oriented. Grossly unremarkable. TEJADA, normal tone, no gross motor or sensory changes. Fluent speech.   PSYCH: Cooperative, appropriate. Mood and affect wnl.

## 2023-09-01 NOTE — ED PROVIDER NOTE - OBJECTIVE STATEMENT
52-year-old male, history of opiate use disorder on methadone, alcohol use disorder, presenting to the emergency room complaining of mid back pain after falling against a metal mailbox today.  Patient states he was intoxicated while sitting near some mailboxes.  He attempted to get up to walk away but stumbled a bit, not realizing the mailbox was right behind him.  He reports falling directly into it but denies hitting his head or LOC.  Patient endorses pain in the area worsened with it.  Denies chest pain, shortness of breath, fevers, chills, nausea or vomiting.

## 2023-09-01 NOTE — ED ADULT NURSE NOTE - NSFALLUNIVINTERV_ED_ALL_ED
Bed/Stretcher in lowest position, wheels locked, appropriate side rails in place/Call bell, personal items and telephone in reach/Instruct patient to call for assistance before getting out of bed/chair/stretcher/Non-slip footwear applied when patient is off stretcher/[place] to call system/Physically safe environment - no spills, clutter or unnecessary equipment/Purposeful proactive rounding/Room/bathroom lighting operational, light cord in reach

## 2023-09-01 NOTE — ED PROVIDER NOTE - CLINICAL SUMMARY MEDICAL DECISION MAKING FREE TEXT BOX
52-year-old male, history of opiate use disorder on methadone, alcohol use disorder, presenting to the emergency room complaining of mid back pain after falling against a metal mailbox today. Patient is noted to have tenderness to palpation along the mid back as well as the right posterior lower ribs.  Will plan to obtain imaging of the thoracic, lumbar spine regions as well as the chest to evaluate for potential fractures.  Patient has steady gait at this time, but due to his recent intoxication will give topical lidocaine patch for pain relief.  Will reassess and dispo pending medical work-up.

## 2023-09-01 NOTE — ED PROVIDER NOTE - PATIENT PORTAL LINK FT
You can access the FollowMyHealth Patient Portal offered by [facility] by registering at the following website: [email or web address]. By joining [employer]’s FollowMyHealth portal, you will also be able to view your health information using other applications (apps) compatible with our system.

## 2023-09-01 NOTE — ED PROVIDER NOTE - NS ED ROS FT
+back pain, contusion  Denies fevers, chills, nausea, vomiting, diarrhea, constipation, abdominal pain, urinary symptoms, chest pain, palpitations, shortness of breath, dyspnea on exertion, syncope/near syncope, cough/URI symptoms, headache, weakness, numbness, focal deficits, visual changes, gait or balance changes, dizziness

## 2023-09-30 ENCOUNTER — EMERGENCY (EMERGENCY)
Facility: HOSPITAL | Age: 53
LOS: 1 days | Discharge: HOME | End: 2023-09-30
Admitting: EMERGENCY MEDICINE
Payer: MEDICAID

## 2023-09-30 VITALS
RESPIRATION RATE: 18 BRPM | SYSTOLIC BLOOD PRESSURE: 116 MMHG | HEIGHT: 74 IN | DIASTOLIC BLOOD PRESSURE: 83 MMHG | HEART RATE: 100 BPM | OXYGEN SATURATION: 97 % | TEMPERATURE: 99 F

## 2023-09-30 VITALS
SYSTOLIC BLOOD PRESSURE: 122 MMHG | RESPIRATION RATE: 20 BRPM | OXYGEN SATURATION: 95 % | DIASTOLIC BLOOD PRESSURE: 88 MMHG | TEMPERATURE: 99 F | HEART RATE: 97 BPM

## 2023-09-30 DIAGNOSIS — Z96.7 PRESENCE OF OTHER BONE AND TENDON IMPLANTS: Chronic | ICD-10-CM

## 2023-09-30 PROCEDURE — 99284 EMERGENCY DEPT VISIT MOD MDM: CPT

## 2023-09-30 NOTE — ED ADULT TRIAGE NOTE - CHIEF COMPLAINT QUOTE
Walk in pt with complaints of chest discomfort which began directly before arrival to ed. Patient reports he drank alcohol before coming into ed as well. Patient Is aox4 with steady gait and clear coherent speech.

## 2023-09-30 NOTE — ED PROVIDER NOTE - PATIENT PORTAL LINK FT
You can access the FollowMyHealth Patient Portal offered by [facility] by registering at the following website: [email or web address]. By joining [employer]’s FollowMyHealth portal, you will also be able to view your health information using other applications (apps) compatible with our system.

## 2023-09-30 NOTE — ED PROVIDER NOTE - PHYSICAL EXAMINATION
CONSTITUTIONAL: Well-appearing; well-nourished; in no apparent distress. no signs of trauma  	HEAD: Normocephalic; atraumatic.   	EYES:  conjunctiva and sclera clear  	ENT: normal nose; no rhinorrhea; normal pharynx with no erythema or lesions.   	NECK: Supple; non-tender;   	CARDIOVASCULAR: Normal S1, S2; no murmurs, rubs, or gallops. Regular rate and rhythm.   	RESPIRATORY: Breathing easily; breath sounds clear and equal bilaterally; no wheezes, rhonchi, or rales.  	GI: Soft; non-distended; non-tender  	EXT: TEJADA x 4.   	SKIN: Normal for age and race; warm; dry; good turgor; no apparent lesions or rash.   	NEURO: A & O x 3; face symmetric; grossly unremarkable.   PSYCHOLOGICAL: The patient’s mood and manner are appropriate.

## 2023-09-30 NOTE — ED PROVIDER NOTE - OBJECTIVE STATEMENT
51 yo male with hx alcohol abuse here requesting help with alcohol use, drinks daily. last drink just prior to ED arrival, he doesn't remember how much he drank. he has otherwise no other medical complaints. denies trauma or fall.

## 2023-10-03 DIAGNOSIS — R07.9 CHEST PAIN, UNSPECIFIED: ICD-10-CM

## 2023-10-03 DIAGNOSIS — F10.10 ALCOHOL ABUSE, UNCOMPLICATED: ICD-10-CM

## 2023-10-10 ENCOUNTER — EMERGENCY (EMERGENCY)
Facility: HOSPITAL | Age: 53
LOS: 1 days | Discharge: HOME | End: 2023-10-10
Attending: EMERGENCY MEDICINE | Admitting: EMERGENCY MEDICINE
Payer: MEDICAID

## 2023-10-10 VITALS
HEIGHT: 74 IN | RESPIRATION RATE: 15 BRPM | DIASTOLIC BLOOD PRESSURE: 81 MMHG | OXYGEN SATURATION: 98 % | HEART RATE: 86 BPM | SYSTOLIC BLOOD PRESSURE: 111 MMHG | TEMPERATURE: 98 F

## 2023-10-10 VITALS — WEIGHT: 175.93 LBS | HEIGHT: 74 IN

## 2023-10-10 DIAGNOSIS — Z96.7 PRESENCE OF OTHER BONE AND TENDON IMPLANTS: Chronic | ICD-10-CM

## 2023-10-10 PROCEDURE — 99284 EMERGENCY DEPT VISIT MOD MDM: CPT

## 2023-10-10 NOTE — ED ADULT NURSE NOTE - CHIEF COMPLAINT QUOTE
MIRA from [facility]; was found asleep there by security and had unsteady gait as per EMS; admits to taking rx psych meds with a 40oz drink (+ETOH)

## 2023-10-10 NOTE — ED ADULT NURSE REASSESSMENT NOTE - NS ED NURSE REASSESS COMMENT FT1
Pt is A&OX3. Respirations are even and unlabored on room air. Pt is ambulatory by self. Pt is stable. Discharge instructions provided. Pt discharged safely.

## 2023-10-10 NOTE — ED ADULT TRIAGE NOTE - CHIEF COMPLAINT QUOTE
MIRA from [facility]; was found asleep there by security and had unsteady gait as per EMS; admits to taking rx psych meds with a 40oz drink (+ETOH)

## 2023-10-10 NOTE — ED ADULT NURSE NOTE - NSFALLUNIVINTERV_ED_ALL_ED
Bed/Stretcher in lowest position, wheels locked, appropriate side rails in place/Call bell, personal items and telephone in reach/Instruct patient to call for assistance before getting out of bed/chair/stretcher/Non-slip footwear applied when patient is off stretcher/[place] to call system/Physically safe environment - no spills, clutter or unnecessary equipment/Purposeful proactive rounding/Room/bathroom lighting operational, light cord in reach

## 2023-10-10 NOTE — ED PROVIDER NOTE - PATIENT PORTAL LINK FT
You can access the FollowMyHealth Patient Portal offered by [facility] by registering at the following website: [email or web address]. By joining [employer]’s FollowMyHealth portal, you will also be able to view your health information using other applications (apps) compatible with our system.

## 2023-10-10 NOTE — ED PROVIDER NOTE - CLINICAL SUMMARY MEDICAL DECISION MAKING FREE TEXT BOX
Patient here with apparent isolated EtOH intoxication. He is steady and wants to go.  Will observe until more awake, alert, steady and with a safe plan for d/c.

## 2023-10-10 NOTE — ED PROVIDER NOTE - PHYSICAL EXAMINATION
Const: Moderate EtOH intox, good hygiene  ENT: Airway patent, protecting airway. Nasal mucosa clear.   Eyes: Clear bilaterally, pupils equal, round 3mm  MSK: No signs of acute trauma or injury.   Neuro: Awake, alert, normal tone, TEJADA, answers all questions, slight slurred speech, gait steady.  Skin: No signs of acute trauma  Psych: Moderate EtOH intox, mostly cooperative

## 2023-10-10 NOTE — ED PROVIDER NOTE - NSFOLLOWUPINSTRUCTIONS_ED_ALL_ED_FT
Please get help for alcohol abuse if you drink heavily or use drugs on a regular basis.     [facility] is a good referral line for crisis and substance abuse help.  [facility] has drop in programs all over the [facility].    Return to the ER for Emergencies.     [facility]: [phone]   [facility] Substance Abuse Services: [phone], option #2   Methadone Maintenance & Ambulatory Opiate Detox: [phone]  [facility]: [phone]  [facility]: [phone]  [facility]: [phone]    [facility]: [phone], option #2   [facility]: [phone]    [facility]: [phone]    [facility] Central Intake: [phone]  [facility] Chemical Dependency/Ancillary Withdrawal: [phone]  [facility] Methadone Maintenance: [phone]    [facility]: [phone]  [facility] Addiction Treatment Services: [phone]    [facility]Line: 3-856-9-[facility] Office of Alcoholism and Substance Abuse Services (OASAS): https://www.oasas.[place].gov/providerdirectory/  [facility] for Addiction Services and Psychotherapy Interventions Research (CASPIR)  www.[facility].org     Interested in discussing options to reduce your tobacco use?    [facility] for Tobacco Control:  [phone]  [facility] QUITLINE: 5-525-VS-QUITS ([phone])    Interested in learning more about substance use?      http://rethinkingdrinking.niaaa.nih.gov   https://www.drugabuse.gov/patients-families     Learn more about opioid overdose prevention programs in [place]:  http://www.health.[place].gov/diseases/aids/general/opioid_overdose_prevention/

## 2023-10-10 NOTE — ED ADULT NURSE NOTE - OBJECTIVE STATEMENT
Pt is A&OX3. Pt denies any pain. No chest pain, SOB, abdominal pain, N/V. Pt c/o itching scalp. Abrasions and redness noted. Pt states he took his clonopin and had 1 40z to drink today. Respirations are even and unlabored on room air. Pt is A&OX3. Pt denies any pain. No chest pain, SOB, abdominal pain, N/V. Pt c/o itching scalp. Abrasions and redness noted. Pt states he took his clonopin and had 1 40z to drink today. Respirations are even and unlabored on room air. Pt states he is on methadone

## 2023-10-10 NOTE — ED ADULT NURSE NOTE - NSSEPSISSUSPECTED_ED_A_ED
Faxed H&P-Surgeon orders COVID19 Test.  
Pt havig surg on Friday 5-8 plse send h&p with med clearance /will also need covid-19 testing would like to know who would order test/pcp or surgeon  
No

## 2023-10-10 NOTE — ED PROVIDER NOTE - OBJECTIVE STATEMENT
Patient was BIBE for public EtOH intoxication. Wants to leave. No unsteady gait, drowsy. No pain, no n/v and no trauma or incontinence. No drugs.

## 2023-10-12 DIAGNOSIS — Z87.39 PERSONAL HISTORY OF OTHER DISEASES OF THE MUSCULOSKELETAL SYSTEM AND CONNECTIVE TISSUE: ICD-10-CM

## 2023-10-12 DIAGNOSIS — Z87.81 PERSONAL HISTORY OF (HEALED) TRAUMATIC FRACTURE: ICD-10-CM

## 2023-10-12 DIAGNOSIS — J45.909 UNSPECIFIED ASTHMA, UNCOMPLICATED: ICD-10-CM

## 2023-10-12 DIAGNOSIS — Z98.890 OTHER SPECIFIED POSTPROCEDURAL STATES: ICD-10-CM

## 2023-10-12 DIAGNOSIS — Z86.59 PERSONAL HISTORY OF OTHER MENTAL AND BEHAVIORAL DISORDERS: ICD-10-CM

## 2023-10-12 DIAGNOSIS — R41.82 ALTERED MENTAL STATUS, UNSPECIFIED: ICD-10-CM

## 2023-10-12 DIAGNOSIS — F10.129 ALCOHOL ABUSE WITH INTOXICATION, UNSPECIFIED: ICD-10-CM

## 2023-11-11 ENCOUNTER — EMERGENCY (EMERGENCY)
Facility: HOSPITAL | Age: 53
LOS: 1 days | Discharge: HOME | End: 2023-11-11
Attending: EMERGENCY MEDICINE | Admitting: EMERGENCY MEDICINE
Payer: MEDICAID

## 2023-11-11 VITALS
DIASTOLIC BLOOD PRESSURE: 76 MMHG | RESPIRATION RATE: 15 BRPM | TEMPERATURE: 98 F | HEIGHT: 74 IN | HEART RATE: 70 BPM | SYSTOLIC BLOOD PRESSURE: 103 MMHG | OXYGEN SATURATION: 97 %

## 2023-11-11 DIAGNOSIS — Z96.7 PRESENCE OF OTHER BONE AND TENDON IMPLANTS: Chronic | ICD-10-CM

## 2023-11-11 LAB
GLUCOSE BLDC GLUCOMTR-MCNC: 177 MG/DL — HIGH (ref 70–99)
GLUCOSE BLDC GLUCOMTR-MCNC: 177 MG/DL — HIGH (ref 70–99)
GLUCOSE BLDC GLUCOMTR-MCNC: 75 MG/DL — SIGNIFICANT CHANGE UP (ref 70–99)
GLUCOSE BLDC GLUCOMTR-MCNC: 75 MG/DL — SIGNIFICANT CHANGE UP (ref 70–99)

## 2023-11-11 PROCEDURE — 99284 EMERGENCY DEPT VISIT MOD MDM: CPT

## 2023-11-11 RX ORDER — SODIUM CHLORIDE 9 MG/ML
1000 INJECTION INTRAMUSCULAR; INTRAVENOUS; SUBCUTANEOUS ONCE
Refills: 0 | Status: COMPLETED | OUTPATIENT
Start: 2023-11-11 | End: 2023-11-11

## 2023-11-11 NOTE — ED PROVIDER NOTE - PATIENT PORTAL LINK FT
You can access the FollowMyHealth Patient Portal offered by [facility] by registering at the following website: [email or web address]. By joining [employer]’s FollowMyHealth portal, you will also be able to view your health information using other applications (apps) compatible with our system.

## 2023-11-11 NOTE — ED ADULT TRIAGE NOTE - CHIEF COMPLAINT QUOTE
patient BIBA from street; call came over as weakness and confusion; initial fingerstick on scene "was in the 40s and he got 1 packet of oral glucose"; second finger stick was 56; 18g Iv in left hand and received 200cc of D5 iv fluids; also admits to +ETOH and 210mg methadone taken today; drowsy on triage stretcher

## 2023-11-11 NOTE — ED PROVIDER NOTE - NSFOLLOWUPINSTRUCTIONS_ED_ALL_ED_FT
1. stay well hydrated  2. limit alcohol intake   3. follow up with your primary care doctor next week  4. return to ER if your symptoms worsen or for any other concern    Follow up with your primary care doctor or clinics listed below if you do not have a doctor  [address] [address]  To make an appointment, call [phone]  [facility]  Address: [address] [place] [address]  Appointment Center: [phone] ([phone])

## 2023-11-11 NOTE — ED PROVIDER NOTE - OBJECTIVE STATEMENT
triage note: patient BIBA from street; call came over as weakness and confusion; initial fingerstick on scene "was in the 40s and he got 1 packet of oral glucose"; second finger stick was 56; 18g Iv in left hand and received 200cc of D5 iv fluids; also admits to +ETOH and 210mg methadone taken today; drowsy on triage stretcher  - triage note: patient BIBA from street; call came over as weakness and confusion; initial fingerstick on scene "was in the 40s and he got 1 packet of oral glucose"; second finger stick was 56; 18g Iv in left hand and received 200cc of D5 iv fluids; also admits to +ETOH and 210mg methadone taken today; drowsy on triage stretcher  -  This is a 53-year-old male who presents to the emergency room by ambulance who was found with weakness and confusion on the street fingerstick blood glucose was performed which was in the 40s he was given 1 packet of oral glucose and then the second fingerstick was 56 and then he was started on D5 fluids at around 200 cc an hour.  Patient states that he was out collecting cans a day to return for money but that he had not eaten any food.  He also states that he drank 140 ounce beer.  He also states that he took his normal daily dose of 210 mg of methadone.  While collecting cans patient states that he felt slowly and progressively weak and dizzy.  And that is approximately around the time when the ambulance came to pick him up.  Patient states in the emergency room he feels better.  He denies headache chest pain neck pain jaw pain arm pain or back pain.  He denies shortness of breath fever or cough.  Patient denies abdominal pain or extremity pain.  Patient takes Klonopin for his anxiety he takes Dilantin for seizure disorder states that he has not had a seizure in many years.  And takes daily methadone with no recent increase in his dosage.

## 2023-11-11 NOTE — ED ADULT NURSE NOTE - NSFALLHARMRISKINTERV_ED_ALL_ED

## 2023-11-11 NOTE — ED ADULT NURSE REASSESSMENT NOTE - NS ED NURSE REASSESS COMMENT FT1
patient is A&Ox3, in stretcher, refusing to remove clothing, tele monitor, bed alarm. MD is aware. Plan of care is being followed.

## 2023-11-11 NOTE — ED PROVIDER NOTE - NEUROLOGICAL, MLM
awake but drowsy  speech slow but appropriate  and oriented, no focal deficits, no motor or sensory deficits.

## 2023-11-11 NOTE — ED PROVIDER NOTE - CLINICAL SUMMARY MEDICAL DECISION MAKING FREE TEXT BOX
A medical screening examination was performed and no emergency medical condition was identified.    The patient's symptoms progressively improved throughout the ED stay.  The patient tolerated PO fluids.    At the time of discharge from the Emergency Department, the patient is alert with fluent appropriate speech and ambulatory without difficulty. The patient demonstrates capacity at time of discharge and verbally consents to discharge.     ED evaluation and management discussed with the patient and family (if available) in detail.  Close PMD and/or specialist follow up explained and encouraged.  Strict ED return instructions discussed in detail for any worsening or new symptoms. The patient and family (if present) was given the opportunity to ask questions about their ER evaluation, discharge diagnosis and discharge instructions. The patient verbalized understanding of this information and instructions and importantly the need to return to the ED for  worsening of illness or for any concern.    The patient received a printed version of the discharge instructions. The patient verbally expressed understanding that the Emergency Department diagnosis is a preliminary diagnosis often based on limited information and that the patient must adhere to the follow-up plan as discussed. intox + methadone use  with hypoglycemia from not eating today     A medical screening examination was performed and no emergency medical condition was identified.    The patient's symptoms progressively improved throughout the ED stay.  The patient tolerated PO fluids.    At the time of discharge from the Emergency Department, the patient is alert with fluent appropriate speech and ambulatory without difficulty. The patient demonstrates capacity at time of discharge and verbally consents to discharge.     ED evaluation and management discussed with the patient and family (if available) in detail.  Close PMD and/or specialist follow up explained and encouraged.  Strict ED return instructions discussed in detail for any worsening or new symptoms. The patient and family (if present) was given the opportunity to ask questions about their ER evaluation, discharge diagnosis and discharge instructions. The patient verbalized understanding of this information and instructions and importantly the need to return to the ED for  worsening of illness or for any concern.    The patient received a printed version of the discharge instructions. The patient verbally expressed understanding that the Emergency Department diagnosis is a preliminary diagnosis often based on limited information and that the patient must adhere to the follow-up plan as discussed. intox + methadone use  with hypoglycemia from not eating today     refused blood draw  has capacity ate 3 cups of peaches     A medical screening examination was performed and no emergency medical condition was identified.    The patient's symptoms progressively improved throughout the ED stay.  The patient tolerated PO fluids.    At the time of discharge from the Emergency Department, the patient is alert with fluent appropriate speech and ambulatory without difficulty. The patient demonstrates capacity at time of discharge and verbally consents to discharge.     ED evaluation and management discussed with the patient and family (if available) in detail.  Close PMD and/or specialist follow up explained and encouraged.  Strict ED return instructions discussed in detail for any worsening or new symptoms. The patient and family (if present) was given the opportunity to ask questions about their ER evaluation, discharge diagnosis and discharge instructions. The patient verbalized understanding of this information and instructions and importantly the need to return to the ED for  worsening of illness or for any concern.    The patient received a printed version of the discharge instructions. The patient verbally expressed understanding that the Emergency Department diagnosis is a preliminary diagnosis often based on limited information and that the patient must adhere to the follow-up plan as discussed.

## 2023-11-11 NOTE — ED PROVIDER NOTE - DISCHARGE REVIEW MATERIAL PRESENTED
Anesthesia Evaluation     Patient summary reviewed and Nursing notes reviewed   no history of anesthetic complications:  NPO Solid Status: > 8 hours  NPO Liquid Status: > 2 hours           Airway   Mallampati: II  TM distance: >3 FB  Neck ROM: full  No difficulty expected  Dental      Pulmonary - negative pulmonary ROS and normal exam    breath sounds clear to auscultation  Cardiovascular - normal exam  Exercise tolerance: good (4-7 METS)    Rhythm: regular  Rate: normal    (+) hypertension,       Neuro/Psych- negative ROS  GI/Hepatic/Renal/Endo    (+)   diabetes mellitus, thyroid problem     Musculoskeletal (-) negative ROS    Abdominal    Substance History - negative use     OB/GYN negative ob/gyn ROS         Other - negative ROS       ROS/Med Hx Other: PAT Nursing Notes unavailable.                   Anesthesia Plan    ASA 2     general       Anesthetic plan, risks, benefits, and alternatives have been provided, discussed and informed consent has been obtained with: patient and spouse/significant other.        CODE STATUS:        .

## 2023-11-13 DIAGNOSIS — F11.90 OPIOID USE, UNSPECIFIED, UNCOMPLICATED: ICD-10-CM

## 2023-11-13 DIAGNOSIS — E16.2 HYPOGLYCEMIA, UNSPECIFIED: ICD-10-CM

## 2023-11-13 DIAGNOSIS — G40.909 EPILEPSY, UNSPECIFIED, NOT INTRACTABLE, WITHOUT STATUS EPILEPTICUS: ICD-10-CM

## 2023-11-13 DIAGNOSIS — F10.929 ALCOHOL USE, UNSPECIFIED WITH INTOXICATION, UNSPECIFIED: ICD-10-CM

## 2023-11-13 DIAGNOSIS — F41.9 ANXIETY DISORDER, UNSPECIFIED: ICD-10-CM

## 2023-11-15 NOTE — ED ADULT NURSE NOTE - CAS TRG GEN SKIN CONDITION
and physical.    DOS STAFF ADDENDUM:    Pt seen and examined, chart reviewed (including anesthesia, drug and allergy history). No interval changes to history and physical examination. Anesthetic plan, risks, benefits, alternatives, and personnel involved discussed with patient. Patient verbalized an understanding and agrees to proceed.       Caitlyn Lizarraga MD  November 15, 2023  1:59 PM Warm/Dry

## 2024-03-14 ENCOUNTER — EMERGENCY (EMERGENCY)
Facility: HOSPITAL | Age: 54
LOS: 1 days | Discharge: LEFT BEFORE DISCHARGE | End: 2024-03-14
Attending: EMERGENCY MEDICINE | Admitting: EMERGENCY MEDICINE
Payer: MEDICAID

## 2024-03-14 VITALS
HEART RATE: 88 BPM | OXYGEN SATURATION: 97 % | WEIGHT: 149.91 LBS | TEMPERATURE: 98 F | RESPIRATION RATE: 18 BRPM | DIASTOLIC BLOOD PRESSURE: 75 MMHG | SYSTOLIC BLOOD PRESSURE: 124 MMHG

## 2024-03-14 DIAGNOSIS — Z96.7 PRESENCE OF OTHER BONE AND TENDON IMPLANTS: Chronic | ICD-10-CM

## 2024-03-14 PROCEDURE — 99284 EMERGENCY DEPT VISIT MOD MDM: CPT

## 2024-03-14 RX ORDER — ACETAMINOPHEN 500 MG
650 TABLET ORAL ONCE
Refills: 0 | Status: DISCONTINUED | OUTPATIENT
Start: 2024-03-14 | End: 2024-03-18

## 2024-03-14 NOTE — ED PROVIDER NOTE - PHYSICAL EXAMINATION
Constitutional: awake and alert, in no acute distress  HEENT: head normocephalic.  small abrasion to R temporal scalp with surrounding TTP.  moist mucous membranes  Eyes: extraocular movements intact, normal conjunctiva  Neck: supple, normal ROM  Cardiovascular: regular rate   Pulmonary: no respiratory distress  Gastrointestinal: abdomen flat and nondistended  Skin: warm, dry, normal for ethnicity  Musculoskeletal: no edema, no deformity  Neurological: oriented x4, no focal neurologic deficit.   Psychiatric: calm and cooperative
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
## 2024-03-14 NOTE — ED ADULT NURSE REASSESSMENT NOTE - NS ED NURSE REASSESS COMMENT FT1
patient eloped from ED; when RN went to give medications patient was not in chair; checked again at 1810 and again patient was NOT in chair; made provider MD Natali Frye aware and ANA CRISTINA Rowe aware as well; pt eloped
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
## 2024-03-14 NOTE — ED ADULT TRIAGE NOTE - CCCP TRG CHIEF CMPLNT
"Nutrition Care Plan    Nutrition Diagnosis:   Inadequate intake related to small bowel obstruction (now resolving) as evidenced by patient with limited oral intakes x 2 days PTA due to abdominal pain PTA, and NPO/clear/full liquid diet x 3 days since admission. Intervention:  Modified diet:   Continue full liquid diet. Patient reports tolerating intakes. Denies abdominal pain or nausea. Reports some ""stomch gurgling\"" but denies that she is uncomfortable. Consumed 100% of cream of chicken soup, pudding and juice for breakfast. Encouraged intakes as tolerated. RD anticipates diet to advance as medically appropriate. Commercial beverage:   Due to modified diet, patient agreeable to trial one high protein low calorie supplement BID with lunch and dinner. If consumed, will provide 320 kcal and 32 g protein. Purpose of the nutrition education:  Discussed importance of adequate caloric and protein intake and use of oral nutrition supplement to promote appetite. Monitoring and Evaluation:  Amount of food:   Goal - patient to consume >75% of 3 meals and supplements as tolerated. Area(s) and level of knowledge:   Pt verbalized inadequate to basic knowledge before and after education.         " headache
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
## 2024-03-18 DIAGNOSIS — J45.909 UNSPECIFIED ASTHMA, UNCOMPLICATED: ICD-10-CM

## 2024-03-18 DIAGNOSIS — S00.01XA ABRASION OF SCALP, INITIAL ENCOUNTER: ICD-10-CM
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
## 2024-03-18 DIAGNOSIS — R51.9 HEADACHE, UNSPECIFIED: ICD-10-CM

## 2024-03-18 DIAGNOSIS — Z53.29 PROCEDURE AND TREATMENT NOT CARRIED OUT BECAUSE OF PATIENT'S DECISION FOR OTHER REASONS: ICD-10-CM

## 2024-12-28 ENCOUNTER — EMERGENCY (EMERGENCY)
Facility: HOSPITAL | Age: 54
LOS: 1 days | Discharge: HOME | End: 2024-12-28
Attending: EMERGENCY MEDICINE | Admitting: EMERGENCY MEDICINE
Payer: MEDICAID

## 2024-12-28 VITALS
SYSTOLIC BLOOD PRESSURE: 112 MMHG | OXYGEN SATURATION: 95 % | RESPIRATION RATE: 18 BRPM | HEART RATE: 76 BPM | TEMPERATURE: 98 F | DIASTOLIC BLOOD PRESSURE: 72 MMHG

## 2024-12-28 DIAGNOSIS — F10.229 ALCOHOL DEPENDENCE WITH INTOXICATION, UNSPECIFIED: ICD-10-CM

## 2024-12-28 DIAGNOSIS — E16.2 HYPOGLYCEMIA, UNSPECIFIED: ICD-10-CM

## 2024-12-28 DIAGNOSIS — Z96.7 PRESENCE OF OTHER BONE AND TENDON IMPLANTS: Chronic | ICD-10-CM

## 2024-12-28 LAB
ALBUMIN SERPL ELPH-MCNC: 3.1 G/DL — LOW (ref 3.4–5)
ALP SERPL-CCNC: 99 U/L — SIGNIFICANT CHANGE UP (ref 40–120)
ALT FLD-CCNC: 34 U/L — SIGNIFICANT CHANGE UP (ref 12–42)
ANION GAP SERPL CALC-SCNC: 5 MMOL/L — LOW (ref 9–16)
ANION GAP SERPL CALC-SCNC: 8 MMOL/L — LOW (ref 9–16)
AST SERPL-CCNC: 57 U/L — HIGH (ref 15–37)
BASOPHILS # BLD AUTO: 0.02 K/UL — SIGNIFICANT CHANGE UP (ref 0–0.2)
BASOPHILS NFR BLD AUTO: 0.4 % — SIGNIFICANT CHANGE UP (ref 0–2)
BILIRUB SERPL-MCNC: 0.3 MG/DL — SIGNIFICANT CHANGE UP (ref 0.2–1.2)
BUN SERPL-MCNC: 8 MG/DL — SIGNIFICANT CHANGE UP (ref 7–23)
BUN SERPL-MCNC: 8 MG/DL — SIGNIFICANT CHANGE UP (ref 7–23)
CALCIUM SERPL-MCNC: 7.8 MG/DL — LOW (ref 8.5–10.5)
CALCIUM SERPL-MCNC: 8.4 MG/DL — LOW (ref 8.5–10.5)
CHLORIDE SERPL-SCNC: 101 MMOL/L — SIGNIFICANT CHANGE UP (ref 96–108)
CHLORIDE SERPL-SCNC: 94 MMOL/L — LOW (ref 96–108)
CO2 SERPL-SCNC: 26 MMOL/L — SIGNIFICANT CHANGE UP (ref 22–31)
CO2 SERPL-SCNC: 29 MMOL/L — SIGNIFICANT CHANGE UP (ref 22–31)
CREAT SERPL-MCNC: 0.82 MG/DL — SIGNIFICANT CHANGE UP (ref 0.5–1.3)
CREAT SERPL-MCNC: 0.89 MG/DL — SIGNIFICANT CHANGE UP (ref 0.5–1.3)
EGFR: 102 ML/MIN/1.73M2 — SIGNIFICANT CHANGE UP
EGFR: 105 ML/MIN/1.73M2 — SIGNIFICANT CHANGE UP
EOSINOPHIL # BLD AUTO: 0.11 K/UL — SIGNIFICANT CHANGE UP (ref 0–0.5)
EOSINOPHIL NFR BLD AUTO: 2 % — SIGNIFICANT CHANGE UP (ref 0–6)
GLUCOSE SERPL-MCNC: 187 MG/DL — HIGH (ref 70–99)
GLUCOSE SERPL-MCNC: 33 MG/DL — CRITICAL LOW (ref 70–99)
HCT VFR BLD CALC: 31.7 % — LOW (ref 39–50)
HGB BLD-MCNC: 10.5 G/DL — LOW (ref 13–17)
IMM GRANULOCYTES NFR BLD AUTO: 0.4 % — SIGNIFICANT CHANGE UP (ref 0–0.9)
LYMPHOCYTES # BLD AUTO: 1.75 K/UL — SIGNIFICANT CHANGE UP (ref 1–3.3)
LYMPHOCYTES # BLD AUTO: 32.1 % — SIGNIFICANT CHANGE UP (ref 13–44)
MAGNESIUM SERPL-MCNC: 1.8 MG/DL — SIGNIFICANT CHANGE UP (ref 1.6–2.6)
MCHC RBC-ENTMCNC: 32.4 PG — SIGNIFICANT CHANGE UP (ref 27–34)
MCHC RBC-ENTMCNC: 33.1 G/DL — SIGNIFICANT CHANGE UP (ref 32–36)
MCV RBC AUTO: 97.8 FL — SIGNIFICANT CHANGE UP (ref 80–100)
MONOCYTES # BLD AUTO: 0.46 K/UL — SIGNIFICANT CHANGE UP (ref 0–0.9)
MONOCYTES NFR BLD AUTO: 8.4 % — SIGNIFICANT CHANGE UP (ref 2–14)
NEUTROPHILS # BLD AUTO: 3.1 K/UL — SIGNIFICANT CHANGE UP (ref 1.8–7.4)
NEUTROPHILS NFR BLD AUTO: 56.7 % — SIGNIFICANT CHANGE UP (ref 43–77)
NRBC # BLD: 0 /100 WBCS — SIGNIFICANT CHANGE UP (ref 0–0)
PLATELET # BLD AUTO: 273 K/UL — SIGNIFICANT CHANGE UP (ref 150–400)
POTASSIUM SERPL-MCNC: 3.7 MMOL/L — SIGNIFICANT CHANGE UP (ref 3.5–5.3)
POTASSIUM SERPL-MCNC: 4.2 MMOL/L — SIGNIFICANT CHANGE UP (ref 3.5–5.3)
POTASSIUM SERPL-SCNC: 3.7 MMOL/L — SIGNIFICANT CHANGE UP (ref 3.5–5.3)
POTASSIUM SERPL-SCNC: 4.2 MMOL/L — SIGNIFICANT CHANGE UP (ref 3.5–5.3)
PROT SERPL-MCNC: 7.7 G/DL — SIGNIFICANT CHANGE UP (ref 6.4–8.2)
RBC # BLD: 3.24 M/UL — LOW (ref 4.2–5.8)
RBC # FLD: 12.8 % — SIGNIFICANT CHANGE UP (ref 10.3–14.5)
SODIUM SERPL-SCNC: 128 MMOL/L — LOW (ref 132–145)
SODIUM SERPL-SCNC: 135 MMOL/L — SIGNIFICANT CHANGE UP (ref 132–145)
WBC # BLD: 5.46 K/UL — SIGNIFICANT CHANGE UP (ref 3.8–10.5)
WBC # FLD AUTO: 5.46 K/UL — SIGNIFICANT CHANGE UP (ref 3.8–10.5)

## 2024-12-28 PROCEDURE — 74177 CT ABD & PELVIS W/CONTRAST: CPT | Mod: 26,MC

## 2024-12-28 PROCEDURE — 99284 EMERGENCY DEPT VISIT MOD MDM: CPT

## 2024-12-28 RX ORDER — SODIUM CHLORIDE 9 MG/ML
1000 INJECTION, SOLUTION INTRAMUSCULAR; INTRAVENOUS; SUBCUTANEOUS ONCE
Refills: 0 | Status: COMPLETED | OUTPATIENT
Start: 2024-12-28 | End: 2024-12-28

## 2024-12-28 RX ORDER — CHLORDIAZEPOXIDE HCL 10 MG
50 CAPSULE ORAL ONCE
Refills: 0 | Status: DISCONTINUED | OUTPATIENT
Start: 2024-12-28 | End: 2024-12-28

## 2024-12-28 RX ORDER — 0.9 % SODIUM CHLORIDE 0.9 %
1000 INTRAVENOUS SOLUTION INTRAVENOUS
Refills: 0 | Status: DISCONTINUED | OUTPATIENT
Start: 2024-12-28 | End: 2025-01-01

## 2024-12-28 RX ADMIN — Medication 50 MILLILITER(S): at 22:45

## 2024-12-28 RX ADMIN — Medication 250 MILLILITER(S): at 20:12

## 2024-12-28 RX ADMIN — SODIUM CHLORIDE 1000 MILLILITER(S): 9 INJECTION, SOLUTION INTRAMUSCULAR; INTRAVENOUS; SUBCUTANEOUS at 16:50

## 2024-12-28 RX ADMIN — Medication 50 MILLILITER(S): at 16:41

## 2024-12-28 RX ADMIN — Medication 50 MILLIGRAM(S): at 19:46

## 2024-12-28 RX ADMIN — Medication 50 MILLILITER(S): at 20:03

## 2024-12-28 RX ADMIN — SODIUM CHLORIDE 1000 MILLILITER(S): 9 INJECTION, SOLUTION INTRAMUSCULAR; INTRAVENOUS; SUBCUTANEOUS at 18:53

## 2024-12-28 RX ADMIN — SODIUM CHLORIDE 1000 MILLILITER(S): 9 INJECTION, SOLUTION INTRAMUSCULAR; INTRAVENOUS; SUBCUTANEOUS at 20:01

## 2024-12-28 NOTE — ED PROVIDER NOTE - NEUROLOGICAL, MLM
Alert and oriented, no focal deficits, no motor or sensory deficits. drowsy but cooperative with fluent and appropriate speech  oriented, no focal deficits, no motor or sensory deficits.

## 2024-12-28 NOTE — ED ADULT NURSE NOTE - CHIEF COMPLAINT QUOTE
intoxicated at [facility] took 210mg methadone also, accucheck 88mmols in field , admits to drinking [place] every day, checked accucheck at triage dr castorena aware of hypoglycemia, p3earl drank x2 ginger ales and given dextrose iv

## 2024-12-28 NOTE — ED PROVIDER NOTE - OBJECTIVE STATEMENT
intoxicated at [facility] took 210mg methadone also, answering all questions accucheck 88mmols, admits to drinking st ides every day  -  The patient is a pleasant 54-year-old male who presents to the emergency room with complaints of alcohol intoxication after drinking [place] malt liquor today.  Patient also states that he took 210 mg of methadone today.    The patient denies the following symptoms:  headache, dizziness/lightheadedness, neck pain/neck stiffness, numbness/tingling, photophobia, change in vision/hearing/gait/mental status/speech,difficulty swallowing, focal weakness, rash, fever, chills, chest/neck/jaw/arm or back pain, palpitations, shortness of breath, diaphoresis, swelling to arm and/or legs, N/V/D, abdominal pain, abdominal distention, back pain, flank pain

## 2024-12-28 NOTE — ED PROVIDER NOTE - PATIENT PORTAL LINK FT
You can access the FollowMyHealth Patient Portal offered by [facility] by registering at the following website: [email or web address]. By joining [employer]’s FollowMyHealth portal, you will also be able to view your health information using other applications (apps) compatible with our system.

## 2024-12-28 NOTE — ED ADULT NURSE REASSESSMENT NOTE - NS ED NURSE REASSESS COMMENT FT1
Medications administered. Pending blood sugar recheck. Pt. moved to room 6 and placed on cardiac monitor. VSS.

## 2024-12-28 NOTE — ED PROVIDER NOTE - NSFOLLOWUPINSTRUCTIONS_ED_ALL_ED_FT
Alcohol Abuse    Alcohol intoxication occurs when the amount of alcohol that a person has consumed impairs his or her ability to mentally and physically function. Chronic alcohol consumption can also lead to a variety of health issues including neurological disease, stomach disease, heart disease, liver disease, etc. Do not drive after drinking alcohol. Drinking enough alcohol to end up in an Emergency Room suggests you may have an alcohol abuse problem. Seek help at a drug addiction center.    SEEK IMMEDIATE MEDICAL CARE IF YOU HAVE ANY OF THE FOLLOWING SYMPTOMS: seizures, vomiting blood, blood in your stool, lightheadedness/dizziness, or becoming shaky to tremulous when you stop drinking. Hypoglycemia  Hypoglycemia is when the amount of sugar, or glucose, in your blood is too low. Low blood sugar can happen if you have diabetes or if you don't have diabetes. It may be an emergency.    What are the causes?  Low blood sugar happens most often in people who have diabetes. It may be caused by:  Diabetes medicine.  Not eating enough, or not eating often enough.  Being more active than normal.  If you don't have diabetes, you may still get low blood sugar if:  There's a tumor in your pancreas. A tumor is a growth of cells that isn't normal.  You don't eat enough, or you fast. Fasting is when you don't eat for long periods at a time.  You have a bad infection or illness.  You have problems after weight loss surgery.  You have kidney or liver problems.  You take certain medicines.  What increases the risk?  You're more likely to have low blood sugar if:  You have diabetes and take medicine for it.  You drink a lot of alcohol.  You get sick.  What are the signs or symptoms?  Mild    Hunger or feeling like you may vomit.  Sweating and feeling cold to the touch.  Feeling dizzy or light-headed.  Being sleepy or having trouble sleeping.  A headache.  Blurry vision.  Mood changes. These include feeling worried, nervous, or easily annoyed.  Moderate    Feeling confused.  Changes in the way you act.  Weakness.  An uneven heartbeat.  Very bad    Having very low blood sugar is an emergency. It can cause:  Fainting.  Seizures.  A coma.  Death.  How is this diagnosed?  A person taking blood from a finger to check blood sugar levels.  Low blood sugar can be found with a blood test. This test tells you how much sugar is in your blood. It's done while you're having symptoms.    Your health care provider may also do an exam and look at your medical history.    How is this treated?  Treating low blood sugar    If you have low blood sugar, eat or drink something with sugar in it right away. The food or drink should have 15 grams of a fast-acting carbohydrate (carb). Options include:  4 oz (120 mL) of fruit juice.  4 oz (120 mL) of soda (not diet soda).  A few pieces of hard candy. Check food labels to see how many pieces to eat.  1 Tbsp (15 mL) of sugar or honey.  4 glucose tablets.  1 tube of glucose gel.  Treating low blood sugar if you have diabetes    Talk with your provider about how much carb you should take. If you're alert and can swallow safely, you may follow the 15:15 rule:  Take 15 grams of a fast-acting carb.  Check your blood sugar 15 minutes after you take the carb.  If your blood sugar is still at or below 70 mg/dL (3.9 mmol/L), take 15 grams of a carb again.  If your blood sugar doesn't go above 70 mg/dL (3.9 mmol/L) after 3 tries, get help right away.  After your blood sugar goes back to normal, eat a meal or a snack within 1 hour.  Always keep 15 grams of a fast-acting carb with you. This could be:  4 glucose tablets.  A few pieces of hard candy.  1 Tbsp (15 mL) of honey or sugar.  1 tube of glucose gel.  Treating very low blood sugar    If your blood sugar is less than 54 mg/dL (3 mmol/L), it's an emergency. Get help right away.  If you can't eat or drink, you will need to be given glucagon. A family member or friend should learn how to check your blood sugar and give you glucagon. Ask your provider if you should keep a glucagon kit at home.  You may also need to be treated in a hospital.  Follow these instructions at home:  If you have diabetes:    Always keep a fast-acting carb (15 grams) with you.  Follow your diabetes care plan. Make sure you:  Know the symptoms of low blood sugar.  Check your blood sugar as often as told. Always check it before and after you exercise.  Always check your blood sugar before you drive.  Take your medicines as told.  Eat on time. Do not skip meals.  Share your diabetes care plan with:  Your work or school.  The people you live with.  Wear an alert bracelet or carry a card that says you have diabetes.  General instructions    If you drink alcohol:  Limit how much you have to:  0–1 drink a day if you're female.  0–2 drinks a day if you're male.  Know how much alcohol is in your drink. In the [place] one drink is one 12 oz bottle of beer (355 mL), one 5 oz glass of wine (148 mL), or one 1½ oz glass of hard liquor (44 mL).  Be sure to eat food when you drink alcohol.  Be sure to check your blood sugar after you drink. Alcohol may lead to low blood sugar later.  Where to find more information  American Diabetes Association (ADA): diabetes.org  Contact a health care provider if:  You have low blood sugar often.  You have diabetes and are having trouble keeping your blood sugar in the right range.  Get help right away if:  You can't get your blood sugar above 70 mg/dL (3.9 mmol/L) after 3 tries.  Your blood sugar is below 54 mg/dL (3 mmol/L).  You have a seizure.  You faint.  These symptoms may be an emergency. Call 911 right away.  Do not wait to see if the symptoms will go away.  Do not drive yourself to the hospital.  This information is not intended to replace advice given to you by your health care provider. Make sure you discuss any questions you have with your health care provider.

## 2024-12-28 NOTE — ED PROVIDER NOTE - PROGRESS NOTE DETAILS
Results of CT discussed with patient at this time he has no active abdominal pain, When palpated nondistended nontender.  Patient will require outpatient GI follow-up and he agrees with this plan.  He is not in alcohol withdrawal and appears otherwise well teto - Received signout of patient at the time of signout patient is awake alert and oriented x 3 and coherent.  He endorses that he will not be amenable to admission and we discussed plan to continue D5 as it will be started now.  And continue to check fingersticks every hour as fingerstick dipped down into the 60s again.  Patient agrees with this plan.  Patient moved into room 6 for full evaluation. Patient was able to tolerate 2 sandwiches and full meal.  Will continue to monitor fingersticks q. hourly and D5 is almost complete.  We also added a CT abdomen pelvis to rule out pancreatic mass versus other pathology. Patient has intermittent dips in glucose now off D5 drip for several hours.  He is now eating breakfast.  Will check 1 more fingerstick.  Patient is not amenable to being admitted for further workup.  He understands the risk could include worsening hypoglycemia given his alcohol use disorder.  Patient is concerned with his methadone dose in the morning which is 210 mg and he has this dispensed at home.  I advised him we could admit him and confirm his methadone dose and patient dose it.  Patient declines.  Patient understands risk could include seizure or cardiac arrest from severe hypoglycemia.  Patient notes that he also has a pet at home and he cannot leave his pet unattended for this long.  He will establish care for his pet with his brother and will obtain his methadone dose for today which is Sunday and his methadone clinic is closed he also does not have his methadone card.  Patient appears nontoxic he is alert and oriented x 3 has insight and capacity to understand risks of signing out prior to full medical management.  He was offered also continued observation in our emergency department or admission at [facility] he declines both. patient requesting discharge, ate breakfast, fs remain stable, patient will return for any worsening. will arrange care for his dog and return. strict return instructions discussed. patient requested librium. will give small dose. no active tremor, coherent, and well appearing.

## 2024-12-28 NOTE — ED PROVIDER NOTE - CARE PLAN
Principal Discharge DX:	Nondiabetic hypoglycemia  Secondary Diagnosis:	Moderate alcohol use disorder   1

## 2024-12-28 NOTE — ED ADULT NURSE NOTE - SUICIDE SCREENING DEPRESSION
No Tamiflu seen in note and no pharmacy given to send prescription too. Would need clarification.     Mary Norton MD     Negative

## 2024-12-28 NOTE — ED PROVIDER NOTE - CONSTITUTIONAL, MLM
Well appearing, awake, alert, oriented to person, place, time/situation and in no apparent distress. normal... in no apparent distress. drowsy but cooperative with fluent and appropriate speech

## 2024-12-28 NOTE — ED ADULT TRIAGE NOTE - CHIEF COMPLAINT QUOTE
intoxicated at [facility] took 210mg methadone also, answering all questions accucheck 88mmols, admits to drinking [facility] [place] every day [facility] at [facility] methadone also, accucheck 88mmols in field , admits to drinking st ides every day, checked accucheck at triage dr castorena aware of hypoglycemia intoxicated at [facility] took 210mg methadone also, accucheck 88mmols in field , admits to drinking st ides every day, checked accucheck at triage dr castorena aware of hypoglycemia, p3earl drank x2 ginger ales and given dextrose iv

## 2024-12-28 NOTE — ED PROVIDER NOTE - CLINICAL SUMMARY MEDICAL DECISION MAKING FREE TEXT BOX
The patient is a pleasant 54-year-old male who presents to the emergency room with complaints of alcohol intoxication after drinking [place] malt liquor today.  Patient also states that he took 210 mg of methadone today.    low BS -     juice / food given   plan for recheck of BG and re-assess The patient is a pleasant 54-year-old male who presents to the emergency room with complaints of alcohol intoxication after drinking [place] malt liquor today.  Patient also states that he took 210 mg of methadone today.    low BS -     juice / food given   plan for recheck of BG and re-assess    A medical screening examination was performed and no emergency medical condition was identified.    The patient's symptoms progressively improved throughout the ED stay.  The patient tolerated PO fluids.    At the time of discharge from the Emergency Department, the patient is alert with fluent appropriate speech and ambulatory without difficulty. The patient demonstrates capacity at time of discharge and verbally consents to discharge.     ED evaluation and management discussed with the patient and family (if available) in detail.  Close PMD and/or specialist follow up explained and encouraged.  Strict ED return instructions discussed in detail for any worsening or new symptoms. The patient and family (if present) was given the opportunity to ask questions about their ER evaluation, discharge diagnosis and discharge instructions. The patient verbalized understanding of this information and instructions and importantly the need to return to the ED for  worsening of illness or for any concern.    The patient received a printed version of the discharge instructions. The patient verbally expressed understanding that the Emergency Department diagnosis is a preliminary diagnosis often based on limited information and that the patient must adhere to the follow-up plan as discussed. The patient is a pleasant 54-year-old male who presents to the emergency room with complaints of alcohol intoxication after drinking [place] malt liquor today.  Patient also states that he took 210 mg of methadone today.    low BS -     juice / food given   plan for recheck of BG and re-assess    @ 8pm - endorsed to dr irene  on repeat bmp - sodium level normalize but with hypoglycemia  1 AMP D50 and then d5 ,45% started  mag level  old GV reviewed -pt with previous similar presentations with hypoglycemia - most likely secondary to chronic alcoholism  pt is not a diabetic - no insulin use or hypoglycemic agents

## 2024-12-29 VITALS
OXYGEN SATURATION: 97 % | DIASTOLIC BLOOD PRESSURE: 74 MMHG | HEART RATE: 62 BPM | SYSTOLIC BLOOD PRESSURE: 124 MMHG | TEMPERATURE: 98 F | RESPIRATION RATE: 18 BRPM

## 2024-12-29 LAB
APPEARANCE UR: CLEAR — SIGNIFICANT CHANGE UP
BILIRUB UR-MCNC: NEGATIVE — SIGNIFICANT CHANGE UP
COLOR SPEC: YELLOW — SIGNIFICANT CHANGE UP
DIFF PNL FLD: NEGATIVE — SIGNIFICANT CHANGE UP
GLUCOSE UR QL: NEGATIVE MG/DL — SIGNIFICANT CHANGE UP
KETONES UR-MCNC: NEGATIVE MG/DL — SIGNIFICANT CHANGE UP
LEUKOCYTE ESTERASE UR-ACNC: NEGATIVE — SIGNIFICANT CHANGE UP
NITRITE UR-MCNC: NEGATIVE — SIGNIFICANT CHANGE UP
PCP SPEC-MCNC: SIGNIFICANT CHANGE UP
PH UR: 6.5 — SIGNIFICANT CHANGE UP (ref 5–8)
PROT UR-MCNC: NEGATIVE MG/DL — SIGNIFICANT CHANGE UP
SP GR SPEC: 1.02 — SIGNIFICANT CHANGE UP (ref 1–1.03)
UROBILINOGEN FLD QL: 1 MG/DL — SIGNIFICANT CHANGE UP (ref 0.2–1)

## 2024-12-29 RX ORDER — CHLORDIAZEPOXIDE HCL 10 MG
50 CAPSULE ORAL ONCE
Refills: 0 | Status: DISCONTINUED | OUTPATIENT
Start: 2024-12-29 | End: 2024-12-29

## 2024-12-29 RX ADMIN — Medication 50 MILLIGRAM(S): at 07:38

## 2024-12-29 RX ADMIN — Medication 1000 MILLILITER(S): at 00:55

## 2024-12-29 RX ADMIN — Medication 50 MILLILITER(S): at 02:02

## 2025-06-27 NOTE — ED ADULT NURSE NOTE - ISOLATION TYPE:
[facility] provides services at a reduced cost to those who are determined to be eligible through [facility]’s financial assistance program. Information regarding [facility]’s financial assistance program can be found by going to [email or web address] or by calling [phone]. None